# Patient Record
Sex: FEMALE | Race: WHITE | Employment: OTHER | URBAN - METROPOLITAN AREA
[De-identification: names, ages, dates, MRNs, and addresses within clinical notes are randomized per-mention and may not be internally consistent; named-entity substitution may affect disease eponyms.]

---

## 2017-01-03 RX ORDER — LETROZOLE 2.5 MG/1
2.5 TABLET, FILM COATED ORAL
Qty: 30 TABLET | Refills: 6 | Status: SHIPPED
Start: 2017-01-03 | End: 2017-11-13

## 2017-01-04 RX ORDER — LETROZOLE 2.5 MG/1
TABLET, FILM COATED ORAL
Qty: 30 TABLET | Refills: 5 | Status: SHIPPED | OUTPATIENT
Start: 2017-01-04 | End: 2017-02-17

## 2017-02-17 ENCOUNTER — OFFICE VISIT (OUTPATIENT)
Dept: INTERNAL MEDICINE CLINIC | Facility: CLINIC | Age: 67
End: 2017-02-17

## 2017-02-17 VITALS
HEART RATE: 66 BPM | TEMPERATURE: 98 F | RESPIRATION RATE: 12 BRPM | HEIGHT: 59 IN | SYSTOLIC BLOOD PRESSURE: 110 MMHG | WEIGHT: 135 LBS | OXYGEN SATURATION: 96 % | BODY MASS INDEX: 27.21 KG/M2 | DIASTOLIC BLOOD PRESSURE: 72 MMHG

## 2017-02-17 DIAGNOSIS — M48.56XS COMPRESSION FRACTURE OF LUMBAR SPINE, NON-TRAUMATIC, SEQUELA: ICD-10-CM

## 2017-02-17 DIAGNOSIS — Z13.220 SCREENING CHOLESTEROL LEVEL: ICD-10-CM

## 2017-02-17 DIAGNOSIS — C50.011 MALIGNANT NEOPLASM INVOLVING BOTH NIPPLE AND AREOLA OF RIGHT BREAST IN FEMALE (HCC): Primary | ICD-10-CM

## 2017-02-17 DIAGNOSIS — R53.83 OTHER FATIGUE: ICD-10-CM

## 2017-02-17 DIAGNOSIS — M85.89 OSTEOPENIA OF MULTIPLE SITES: ICD-10-CM

## 2017-02-17 PROCEDURE — 99214 OFFICE O/P EST MOD 30 MIN: CPT | Performed by: INTERNAL MEDICINE

## 2017-02-17 NOTE — PROGRESS NOTES
HPI:    Patient ID: Galina Brooks is a 77year old female. HPIpt here fu on ongoing issues and htn. Is on SERM and effexor. Has made a full recovery from CAP. Lymphedema has resolved in the right arm after resolution of clot.     Review of Systems Psychiatric: She has a normal mood and affect.               ASSESSMENT/PLAN:   Malignant neoplasm involving both nipple and areola of right breast in female (hcc)  (primary encounter diagnosis)  Osteopenia of multiple sites  Compression fracture of lumba

## 2017-04-11 ENCOUNTER — OFFICE VISIT (OUTPATIENT)
Dept: FAMILY MEDICINE CLINIC | Facility: CLINIC | Age: 67
End: 2017-04-11

## 2017-04-11 VITALS
OXYGEN SATURATION: 98 % | HEART RATE: 90 BPM | TEMPERATURE: 98 F | RESPIRATION RATE: 16 BRPM | SYSTOLIC BLOOD PRESSURE: 124 MMHG | DIASTOLIC BLOOD PRESSURE: 80 MMHG

## 2017-04-11 DIAGNOSIS — J06.9 URI WITH COUGH AND CONGESTION: Primary | ICD-10-CM

## 2017-04-11 PROCEDURE — 99212 OFFICE O/P EST SF 10 MIN: CPT | Performed by: NURSE PRACTITIONER

## 2017-04-11 NOTE — PROGRESS NOTES
CHIEF COMPLAINT:   Patient presents with:  Cough    HPI:   Easton Enamorado is a 77year old female who presents for cough for 2 weeks. Began with sneezing and a nasal \"itch\". Then progressed to congestion, dry scratchy sore throat and cough.  She has had SKIN: No rashes, or other skin lesions. No changes in color, consistency or texture. HEENT: Denies headache, ear pain or decreased hearing, ocular complaints, nasal congestion or sore throat. See HPI  CARDIOVASCULAR: Denies chest pain or palpitations. - Continue excellent home care strategies  - I am recommending supportive care and symptom management with analgesics, increased fluid intake, steam inhalation, antitussives and expectorants  - Discussed that green sputum does not indicate need for antibio · You may use acetaminophen or ibuprofen to control pain and fever, unless another medicine was prescribed.  (Note: If you have chronic liver or kidney disease, have ever had a stomach ulcer or gastrointestinal bleeding, or are taking blood-thinning medicin

## 2017-04-21 ENCOUNTER — OFFICE VISIT (OUTPATIENT)
Dept: INTERNAL MEDICINE CLINIC | Facility: CLINIC | Age: 67
End: 2017-04-21

## 2017-04-21 VITALS
HEART RATE: 109 BPM | WEIGHT: 141 LBS | DIASTOLIC BLOOD PRESSURE: 80 MMHG | SYSTOLIC BLOOD PRESSURE: 124 MMHG | OXYGEN SATURATION: 97 % | HEIGHT: 59 IN | BODY MASS INDEX: 28.43 KG/M2

## 2017-04-21 DIAGNOSIS — J98.01 POST-INFECTION BRONCHOSPASM: Primary | ICD-10-CM

## 2017-04-21 DIAGNOSIS — J30.1 SEASONAL ALLERGIC RHINITIS DUE TO POLLEN: ICD-10-CM

## 2017-04-21 PROCEDURE — 99213 OFFICE O/P EST LOW 20 MIN: CPT | Performed by: FAMILY MEDICINE

## 2017-04-21 NOTE — PROGRESS NOTES
CC:  Cough      Hx of CC:  COUGH X 2 WEEKS WHICH IS SOMEWHAT IMPROVED. ALSO C/O NASAL CONGESTION AND RHINORRHEA DURING SAME TIME.     Vitals:    04/21/17  0938   BP: 124/80   Pulse: 109   Height: 59\"   Weight: 141 lb   SpO2: 97%         Body mass index is

## 2017-05-01 ENCOUNTER — TELEPHONE (OUTPATIENT)
Dept: INTERNAL MEDICINE CLINIC | Facility: CLINIC | Age: 67
End: 2017-05-01

## 2017-05-01 DIAGNOSIS — C50.911 MALIGNANT NEOPLASM OF RIGHT FEMALE BREAST, UNSPECIFIED SITE OF BREAST: Primary | ICD-10-CM

## 2017-05-02 ENCOUNTER — TELEPHONE (OUTPATIENT)
Dept: INTERNAL MEDICINE CLINIC | Facility: CLINIC | Age: 67
End: 2017-05-02

## 2017-05-02 DIAGNOSIS — C50.912 MALIGNANT NEOPLASM OF LEFT FEMALE BREAST, UNSPECIFIED SITE OF BREAST: Primary | ICD-10-CM

## 2017-05-03 ENCOUNTER — HOSPITAL ENCOUNTER (OUTPATIENT)
Dept: MAMMOGRAPHY | Facility: HOSPITAL | Age: 67
Discharge: HOME | End: 2017-05-03
Attending: NURSE PRACTITIONER
Payer: MEDICARE

## 2017-05-03 ENCOUNTER — HOSPITAL ENCOUNTER (OUTPATIENT)
Dept: BONE DENSITY | Facility: HOSPITAL | Age: 67
Discharge: HOME | End: 2017-05-03
Attending: INTERNAL MEDICINE
Payer: MEDICARE

## 2017-05-03 DIAGNOSIS — Z78.0 ASYMPTOMATIC MENOPAUSAL STATE: ICD-10-CM

## 2017-05-03 DIAGNOSIS — C50.911 MALIGNANT NEOPLASM OF RIGHT FEMALE BREAST, UNSPECIFIED SITE OF BREAST: ICD-10-CM

## 2017-05-03 DIAGNOSIS — Z90.11 H/O RIGHT MASTECTOMY: ICD-10-CM

## 2017-05-03 DIAGNOSIS — M85.80 OSTEOPENIA: ICD-10-CM

## 2017-05-03 DIAGNOSIS — Z13.820 SCREENING FOR OSTEOPOROSIS: ICD-10-CM

## 2017-05-03 PROCEDURE — 77080 DXA BONE DENSITY AXIAL: CPT

## 2017-05-03 PROCEDURE — 77065 DX MAMMO INCL CAD UNI: CPT | Performed by: RADIOLOGY

## 2017-05-04 ENCOUNTER — TELEPHONE (OUTPATIENT)
Dept: INTERNAL MEDICINE CLINIC | Facility: CLINIC | Age: 67
End: 2017-05-04

## 2017-05-11 ENCOUNTER — APPOINTMENT (OUTPATIENT)
Dept: HEMATOLOGY/ONCOLOGY | Facility: HOSPITAL | Age: 67
End: 2017-05-11
Attending: INTERNAL MEDICINE
Payer: MEDICARE

## 2017-05-11 ENCOUNTER — OFFICE VISIT (OUTPATIENT)
Dept: RADIATION ONCOLOGY | Facility: HOSPITAL | Age: 67
End: 2017-05-11
Attending: RADIOLOGY
Payer: MEDICARE

## 2017-05-11 VITALS
HEIGHT: 59 IN | WEIGHT: 140.38 LBS | BODY MASS INDEX: 28.3 KG/M2 | RESPIRATION RATE: 16 BRPM | SYSTOLIC BLOOD PRESSURE: 136 MMHG | DIASTOLIC BLOOD PRESSURE: 73 MMHG | HEART RATE: 85 BPM

## 2017-05-11 PROCEDURE — 99211 OFF/OP EST MAY X REQ PHY/QHP: CPT

## 2017-05-11 NOTE — PROGRESS NOTES
RADIATION ONCOLOGY NOTE    DATE OF VISIT: 5/11/2017    DIAGNOSIS:  Infiltrating ductal carcinoma of the right breast,  stage IIB, status post neoadjuvant endocrine therapy, followed by mastectomy and sentinel lymph node biopsy with 2 out of 3 lymph nodes p ALLERGIES :   No Known Allergies    PAST MEDICAL HISTORY:   has a past medical history of Fx humerus shaft-closed; Hypercholesterolemia; Breast cancer ([facility] [address] Depression; and CVA (cerebral infarction).     PAST SURGICAL HISTORY:   has past surgical his Compression fracture of lumbar spine, non-traumatic ([facility] [address]      80 yo with Infiltrating ductal carcinoma of the right breast,  stage IIB, status post neoadjuvant endocrine therapy, followed by mastectomy and sentinel lymph node biopsy with 2 out of 3 lymph in standard deviation (s.d.). Each 1 s.d. corresponds to approximately 10% below peak normal bone density.          [employer] ORGANIZATION CRITERIA  NORMAL T SCORE:      Above -1 s.d.     OSTEOPENIA T SCORE:           Between -1 and -2.5 s.d.     OSTEOP b - There are scattered areas of fibroglandular density. FINDINGS:        Digital images were obtained and were reviewed with the R2 ZAINAB [de-identified] CAD device. LEFT BREAST:  No significant suspicious finding.   No significant tani

## 2017-05-11 NOTE — PROGRESS NOTES
Pt seen in 6 month follow up with Dr Paris Hutchinson, having completed RT to the R CW 11/5/15. Will see Dr Jens Du 5/15/17. Recent L mammogram and bone density scan available for MD review. Bone density noted severe osteopenia L femoral neck, and osteoporosis to the spine.

## 2017-05-15 ENCOUNTER — OFFICE VISIT (OUTPATIENT)
Dept: HEMATOLOGY/ONCOLOGY | Facility: HOSPITAL | Age: 67
End: 2017-05-15
Attending: NURSE PRACTITIONER
Payer: MEDICARE

## 2017-05-15 VITALS
RESPIRATION RATE: 16 BRPM | TEMPERATURE: 98 F | HEIGHT: 59 IN | SYSTOLIC BLOOD PRESSURE: 132 MMHG | WEIGHT: 137 LBS | HEART RATE: 89 BPM | DIASTOLIC BLOOD PRESSURE: 88 MMHG | BODY MASS INDEX: 27.62 KG/M2

## 2017-05-15 DIAGNOSIS — Z51.81 ENCOUNTER FOR MONITORING AROMATASE INHIBITOR THERAPY: ICD-10-CM

## 2017-05-15 DIAGNOSIS — Z79.811 ENCOUNTER FOR MONITORING AROMATASE INHIBITOR THERAPY: ICD-10-CM

## 2017-05-15 DIAGNOSIS — C50.011 MALIGNANT NEOPLASM INVOLVING BOTH NIPPLE AND AREOLA OF RIGHT BREAST IN FEMALE, ESTROGEN RECEPTOR POSITIVE (HCC): Primary | ICD-10-CM

## 2017-05-15 DIAGNOSIS — Z17.0 MALIGNANT NEOPLASM INVOLVING BOTH NIPPLE AND AREOLA OF RIGHT BREAST IN FEMALE, ESTROGEN RECEPTOR POSITIVE (HCC): Primary | ICD-10-CM

## 2017-05-15 PROCEDURE — 99214 OFFICE O/P EST MOD 30 MIN: CPT | Performed by: NURSE PRACTITIONER

## 2017-05-15 PROCEDURE — G0463 HOSPITAL OUTPT CLINIC VISIT: HCPCS | Performed by: NURSE PRACTITIONER

## 2017-05-15 NOTE — PROGRESS NOTES
HPI     Edwyna Stagers is a 79year old female here for f/u of Malignant neoplasm involving both nipple and areola of right breast in female, estrogen receptor positive (hcc)  (primary encounter diagnosis)  Encounter for monitoring aromatase inhibitor t environmental allergies. Neurological: Negative for dizziness, weakness, light-headedness, numbness and headaches. Hematological: Negative for adenopathy. Does not bruise/bleed easily. Psychiatric/Behavioral: Negative for sleep disturbance.  The patie Comment: toxic fumes      Social History Narrative     Family History   Problem Relation Age of Onset   • Arthritis Mother    • Cancer Father      pancreatic ca age 62s   • Hypertension Father    • Breast Cancer Self 59   • Breast Cancer Paternal Cousin Fe inguinal and no supraclavicular adenopathy present. Left: No inguinal and no supraclavicular adenopathy present. Neurological: She is alert and oriented to person, place, and time. Gait normal.   Skin: No rash noted. No erythema. Psychiatric:  Sh BiRads 1. Lymphedema of the RUE- minimal, encouraged ROM exercises     [facility] info given to patient       No orders of the defined types were placed in this encounter.        Results From Past 48 Hours:  No results found for this or any previous completion of exam.      Breast marker legend used on images     Triangle = Palpable lump  [place]ug[place] = Skin tag or mole  BB = Nipple  Linear feng = Scar    Square = Pain       Final result (5/3/2017  3:46 PM) Open        Study Result      PROCEDURE:   XR DEXA fracture > 3% or a 10-year probability of a major osteoporosis-related fracture > 20% based on the [place]-adapted WHO algorithm      Dictated by (CST): Anna Mortensen MD on 5/03/2017 at 15:43        Approved by (CST): Anna Mortensen MD on 5/0

## 2017-06-22 ENCOUNTER — MA CHART PREP (OUTPATIENT)
Dept: FAMILY MEDICINE CLINIC | Facility: CLINIC | Age: 67
End: 2017-06-22

## 2017-06-22 PROBLEM — I70.0 ATHEROSCLEROSIS OF AORTA: Status: ACTIVE | Noted: 2017-06-22

## 2017-06-22 PROBLEM — M81.0 OSTEOPOROSIS OF LUMBAR SPINE: Status: ACTIVE | Noted: 2017-06-22

## 2017-06-22 PROBLEM — I70.0 ATHEROSCLEROSIS OF AORTA (HCC): Status: ACTIVE | Noted: 2017-06-22

## 2017-06-23 ENCOUNTER — OFFICE VISIT (OUTPATIENT)
Dept: INTERNAL MEDICINE CLINIC | Facility: CLINIC | Age: 67
End: 2017-06-23

## 2017-06-23 VITALS
SYSTOLIC BLOOD PRESSURE: 128 MMHG | TEMPERATURE: 98 F | OXYGEN SATURATION: 99 % | HEART RATE: 97 BPM | WEIGHT: 138 LBS | DIASTOLIC BLOOD PRESSURE: 78 MMHG | BODY MASS INDEX: 28 KG/M2

## 2017-06-23 DIAGNOSIS — F32.A DEPRESSION, UNSPECIFIED DEPRESSION TYPE: ICD-10-CM

## 2017-06-23 DIAGNOSIS — I89.0 LYMPHEDEMA: ICD-10-CM

## 2017-06-23 DIAGNOSIS — M85.80 OSTEOPENIA, UNSPECIFIED LOCATION: ICD-10-CM

## 2017-06-23 DIAGNOSIS — Z90.11 S/P RIGHT MASTECTOMY: ICD-10-CM

## 2017-06-23 DIAGNOSIS — L98.9 PSORIASIS/LIKE DISORDERS: ICD-10-CM

## 2017-06-23 DIAGNOSIS — M79.89 ARM SWELLING: ICD-10-CM

## 2017-06-23 DIAGNOSIS — Z17.0 MALIGNANT NEOPLASM OF NIPPLE OF RIGHT BREAST IN FEMALE, ESTROGEN RECEPTOR POSITIVE (HCC): ICD-10-CM

## 2017-06-23 DIAGNOSIS — E78.2 MIXED HYPERLIPIDEMIA: ICD-10-CM

## 2017-06-23 DIAGNOSIS — C50.011 MALIGNANT NEOPLASM OF NIPPLE OF RIGHT BREAST IN FEMALE, ESTROGEN RECEPTOR POSITIVE (HCC): ICD-10-CM

## 2017-06-23 DIAGNOSIS — Z00.00 MEDICARE ANNUAL WELLNESS VISIT, INITIAL: Primary | ICD-10-CM

## 2017-06-23 DIAGNOSIS — I10 ESSENTIAL HYPERTENSION: ICD-10-CM

## 2017-06-23 DIAGNOSIS — Z78.0 ASYMPTOMATIC MENOPAUSAL STATE: ICD-10-CM

## 2017-06-23 PROCEDURE — 96160 PT-FOCUSED HLTH RISK ASSMT: CPT | Performed by: INTERNAL MEDICINE

## 2017-06-23 PROCEDURE — 99397 PER PM REEVAL EST PAT 65+ YR: CPT | Performed by: INTERNAL MEDICINE

## 2017-06-23 PROCEDURE — G0438 PPPS, INITIAL VISIT: HCPCS | Performed by: INTERNAL MEDICINE

## 2017-06-23 RX ORDER — VALSARTAN 80 MG/1
80 TABLET ORAL DAILY
Qty: 30 TABLET | Refills: 6 | Status: SHIPPED | OUTPATIENT
Start: 2017-06-23 | End: 2018-02-04

## 2017-06-23 RX ORDER — VENLAFAXINE 37.5 MG/1
37.5 TABLET ORAL DAILY
Qty: 30 TABLET | Refills: 6 | Status: SHIPPED | OUTPATIENT
Start: 2017-06-23 | End: 2018-02-07

## 2017-06-23 RX ORDER — ATORVASTATIN CALCIUM 20 MG/1
20 TABLET, FILM COATED ORAL NIGHTLY
Qty: 30 TABLET | Refills: 6 | Status: SHIPPED | OUTPATIENT
Start: 2017-06-23 | End: 2017-11-13 | Stop reason: DRUGHIGH

## 2017-06-23 RX ORDER — RISEDRONATE SODIUM 150 MG/1
150 TABLET, FILM COATED ORAL
Qty: 1 TABLET | Refills: 6 | Status: SHIPPED | OUTPATIENT
Start: 2017-06-23 | End: 2017-11-13

## 2017-06-23 NOTE — PROGRESS NOTES
HPI:    Patient ID: Sagrario Velásquez is a 79year old female. HPI  MA supervisit. Fu on bp, hyperlipidemia and right sided breast cancer. Rescnt Dexa shows osteoporosis. Has left hip pain Has a erythemetous rash on both feet w nail involvement.     Rev Visit:  Signed Prescriptions Disp Refills    atorvastatin 20 MG Oral Tab 30 tablet 6      Sig: Take 1 tablet (20 mg total) by mouth nightly. valsartan 80 MG Oral Tab 30 tablet 6      Sig: Take 1 tablet (80 mg total) by mouth daily.       Venlafaxine HC Able without help    Taking medications as prescribed: Able without help    Are you able to afford your medications?: Yes    Hearing Problems?: Yes     Functional Status     Hearing Problems?: Yes    Vision Problems? : Yes    Difficulty walking?: Yes    Roderick Richey by mouth daily. Disp: 30 tablet Rfl: 6   Venlafaxine HCl 37.5 MG Oral Tab Take 1 tablet (37.5 mg total) by mouth daily. Disp: 30 tablet Rfl: 6   Risedronate Sodium 150 MG Oral Tab Take 1 tablet (150 mg total) by mouth every 30 (thirty) days.  Disp: 1 tablet abdominal pain, denies heartburn  : denies dysuria, vaginal discharge or itching, no complaint of urinary incontinence   MUSCULOSKELETAL: denies back pain left hip pain  NEURO: denies headaches  PSYCHE: depression  HEMATOLOGIC: denies hx of anemia  ENDOC Vitamin D, 25-Hydroxy [E]; Future    Essential hypertension    Mixed hyperlipidemia  -     Comp Metabolic Panel (14) [E]; Future  -     Lipid Panel [E]; Future  -     TSH W Reflex To Free T4 [E];  Future    Psoriasis/like disorders  -     Derm Referral - In care reminders to display for this patient. Update Health Maintenance if applicable    Pap  Every two years There are no preventive care reminders to display for this patient.  Update Health Maintenance if applicable    Chlamydia  Annually if high risk No r visit. No flowsheet data found.       SCREENING SCHEDULE - FEMALE      SCREEN COVERAGE SCHEDULE  (If Indicated) LAST DONE   Dexa If at Risk q2 years    Lipids All Patients q5 years LDL CHOLESTEROL (mg/dL)   Date Value   11/02/2016 141*      Colonoscopy All

## 2017-06-26 ENCOUNTER — TELEPHONE (OUTPATIENT)
Dept: INTERNAL MEDICINE CLINIC | Facility: CLINIC | Age: 67
End: 2017-06-26

## 2017-06-26 DIAGNOSIS — R39.9 UTI SYMPTOMS: Primary | ICD-10-CM

## 2017-06-27 ENCOUNTER — NURSE ONLY (OUTPATIENT)
Dept: INTERNAL MEDICINE CLINIC | Facility: CLINIC | Age: 67
End: 2017-06-27

## 2017-06-27 DIAGNOSIS — E78.2 MIXED HYPERLIPIDEMIA: ICD-10-CM

## 2017-06-27 DIAGNOSIS — M85.80 OSTEOPENIA, UNSPECIFIED LOCATION: ICD-10-CM

## 2017-06-27 DIAGNOSIS — Z00.00 MEDICARE ANNUAL WELLNESS VISIT, INITIAL: ICD-10-CM

## 2017-06-27 PROCEDURE — 36415 COLL VENOUS BLD VENIPUNCTURE: CPT | Performed by: INTERNAL MEDICINE

## 2017-06-27 PROCEDURE — 84443 ASSAY THYROID STIM HORMONE: CPT | Performed by: INTERNAL MEDICINE

## 2017-06-27 PROCEDURE — 82306 VITAMIN D 25 HYDROXY: CPT | Performed by: INTERNAL MEDICINE

## 2017-06-27 PROCEDURE — 80061 LIPID PANEL: CPT | Performed by: INTERNAL MEDICINE

## 2017-06-27 PROCEDURE — 85027 COMPLETE CBC AUTOMATED: CPT | Performed by: INTERNAL MEDICINE

## 2017-06-27 PROCEDURE — 80053 COMPREHEN METABOLIC PANEL: CPT | Performed by: INTERNAL MEDICINE

## 2017-09-06 ENCOUNTER — TELEPHONE (OUTPATIENT)
Dept: INTERNAL MEDICINE CLINIC | Facility: CLINIC | Age: 67
End: 2017-09-06

## 2017-09-06 DIAGNOSIS — Z12.11 COLON CANCER SCREENING: Primary | ICD-10-CM

## 2017-11-13 ENCOUNTER — OFFICE VISIT (OUTPATIENT)
Dept: HEMATOLOGY/ONCOLOGY | Facility: HOSPITAL | Age: 67
End: 2017-11-13
Attending: INTERNAL MEDICINE
Payer: MEDICARE

## 2017-11-13 VITALS
TEMPERATURE: 99 F | RESPIRATION RATE: 16 BRPM | SYSTOLIC BLOOD PRESSURE: 136 MMHG | HEIGHT: 59 IN | HEART RATE: 89 BPM | BODY MASS INDEX: 27.62 KG/M2 | DIASTOLIC BLOOD PRESSURE: 83 MMHG | WEIGHT: 137 LBS

## 2017-11-13 DIAGNOSIS — C50.411 MALIGNANT NEOPLASM OF UPPER-OUTER QUADRANT OF RIGHT BREAST IN FEMALE, ESTROGEN RECEPTOR POSITIVE (HCC): Primary | ICD-10-CM

## 2017-11-13 DIAGNOSIS — Z17.0 MALIGNANT NEOPLASM OF UPPER-OUTER QUADRANT OF RIGHT BREAST IN FEMALE, ESTROGEN RECEPTOR POSITIVE (HCC): Primary | ICD-10-CM

## 2017-11-13 DIAGNOSIS — Z90.11 S/P RIGHT MASTECTOMY: ICD-10-CM

## 2017-11-13 DIAGNOSIS — Z12.31 SCREENING MAMMOGRAM, ENCOUNTER FOR: ICD-10-CM

## 2017-11-13 DIAGNOSIS — Z79.811 ENCOUNTER FOR MONITORING AROMATASE INHIBITOR THERAPY: ICD-10-CM

## 2017-11-13 DIAGNOSIS — Z51.81 ENCOUNTER FOR MONITORING AROMATASE INHIBITOR THERAPY: ICD-10-CM

## 2017-11-13 PROCEDURE — G0463 HOSPITAL OUTPT CLINIC VISIT: HCPCS | Performed by: INTERNAL MEDICINE

## 2017-11-13 PROCEDURE — 99214 OFFICE O/P EST MOD 30 MIN: CPT | Performed by: INTERNAL MEDICINE

## 2017-11-13 RX ORDER — LETROZOLE 2.5 MG/1
2.5 TABLET, FILM COATED ORAL
Qty: 30 TABLET | Refills: 6 | Status: SHIPPED | OUTPATIENT
Start: 2017-11-13 | End: 2018-05-14

## 2017-11-13 NOTE — PROGRESS NOTES
HPI     Earnest Silveira is a 79year old female here for f/u of Malignant neoplasm of upper-outer quadrant of right breast in female, estrogen receptor positive (hcc)  (primary encounter diagnosis)  Encounter for monitoring aromatase inhibitor therapy  S pallor. Allergic/Immunologic: Positive for environmental allergies. Neurological: Positive for dizziness (from effexor) and weakness (L side weak from surgery. ). Negative for light-headedness, numbness and headaches.    Hematological: Negative for adeno Smoker  1.00 Packs/day  For 20.00 Years     Smokeless tobacco: Never Used    Alcohol use Yes  0.6 oz/week    1 Glasses of wine per week         Comment: rarely    Drug use: No    Sexual activity: Not on file     Other Topics Concern    Caffeine Concern Yes tenderness. Right mastectomy. Abdominal: Soft. Bowel sounds are normal. She exhibits no distension and no mass. There is no tenderness. There is no rebound and no guarding. Musculoskeletal: She exhibits edema (uppper right arm ).  She exhibits no te did have positive LNs with extracapsular extension. 2/3 LN  RT to chest wall and axilla completed. Oncotype Dx RS of 19. No additional benefit from chemo.     Will continue with the femara  4 yrs of femara alone (5 yr total adjuvant, until August o

## 2018-02-05 RX ORDER — VALSARTAN 80 MG/1
TABLET ORAL
Qty: 30 TABLET | Refills: 0 | Status: SHIPPED | OUTPATIENT
Start: 2018-02-05 | End: 2018-03-08

## 2018-02-07 DIAGNOSIS — F32.A DEPRESSION, UNSPECIFIED DEPRESSION TYPE: ICD-10-CM

## 2018-02-07 RX ORDER — VENLAFAXINE 37.5 MG/1
TABLET ORAL
Qty: 30 TABLET | Refills: 0 | Status: SHIPPED | OUTPATIENT
Start: 2018-02-07 | End: 2018-03-18

## 2018-03-08 RX ORDER — VALSARTAN 80 MG/1
TABLET ORAL
Qty: 30 TABLET | Refills: 0 | Status: SHIPPED | OUTPATIENT
Start: 2018-03-08 | End: 2018-03-30

## 2018-03-08 RX ORDER — ATORVASTATIN CALCIUM 40 MG/1
TABLET, FILM COATED ORAL
Qty: 30 TABLET | Refills: 0 | Status: SHIPPED | OUTPATIENT
Start: 2018-03-08 | End: 2018-03-30

## 2018-03-18 DIAGNOSIS — F32.A DEPRESSION, UNSPECIFIED DEPRESSION TYPE: ICD-10-CM

## 2018-03-19 RX ORDER — VENLAFAXINE 37.5 MG/1
TABLET ORAL
Qty: 30 TABLET | Refills: 0 | Status: SHIPPED | OUTPATIENT
Start: 2018-03-19 | End: 2018-03-30

## 2018-03-28 PROBLEM — Z12.31 SCREENING MAMMOGRAM, ENCOUNTER FOR: Status: RESOLVED | Noted: 2017-11-13 | Resolved: 2018-03-28

## 2018-03-28 PROBLEM — M81.0 OSTEOPOROSIS OF LUMBAR SPINE: Status: RESOLVED | Noted: 2017-06-22 | Resolved: 2018-03-28

## 2018-03-28 PROBLEM — I70.0 ATHEROSCLEROSIS OF AORTA: Status: RESOLVED | Noted: 2017-06-22 | Resolved: 2018-03-28

## 2018-03-28 PROBLEM — I70.0 ATHEROSCLEROSIS OF AORTA (HCC): Status: RESOLVED | Noted: 2017-06-22 | Resolved: 2018-03-28

## 2018-03-30 ENCOUNTER — OFFICE VISIT (OUTPATIENT)
Dept: INTERNAL MEDICINE CLINIC | Facility: CLINIC | Age: 68
End: 2018-03-30

## 2018-03-30 VITALS
DIASTOLIC BLOOD PRESSURE: 80 MMHG | OXYGEN SATURATION: 98 % | SYSTOLIC BLOOD PRESSURE: 128 MMHG | BODY MASS INDEX: 27.21 KG/M2 | HEIGHT: 59 IN | HEART RATE: 91 BPM | TEMPERATURE: 99 F | WEIGHT: 135 LBS | RESPIRATION RATE: 18 BRPM

## 2018-03-30 DIAGNOSIS — F32.A DEPRESSION, UNSPECIFIED DEPRESSION TYPE: ICD-10-CM

## 2018-03-30 DIAGNOSIS — Z12.11 COLON CANCER SCREENING: ICD-10-CM

## 2018-03-30 DIAGNOSIS — C50.011 MALIGNANT NEOPLASM OF NIPPLE OF RIGHT BREAST IN FEMALE, ESTROGEN RECEPTOR POSITIVE (HCC): ICD-10-CM

## 2018-03-30 DIAGNOSIS — Z00.00 MEDICARE ANNUAL WELLNESS VISIT, SUBSEQUENT: Primary | ICD-10-CM

## 2018-03-30 DIAGNOSIS — Z17.0 MALIGNANT NEOPLASM OF NIPPLE OF RIGHT BREAST IN FEMALE, ESTROGEN RECEPTOR POSITIVE (HCC): ICD-10-CM

## 2018-03-30 DIAGNOSIS — E78.2 MIXED HYPERLIPIDEMIA: ICD-10-CM

## 2018-03-30 DIAGNOSIS — I10 ESSENTIAL HYPERTENSION: ICD-10-CM

## 2018-03-30 LAB
ALBUMIN SERPL BCP-MCNC: 4.4 G/DL (ref 3.5–4.8)
ALBUMIN/GLOB SERPL: 1.8 {RATIO} (ref 1–2)
ALP SERPL-CCNC: 88 U/L (ref 32–100)
ALT SERPL-CCNC: 23 U/L (ref 14–54)
ANION GAP SERPL CALC-SCNC: 11 MMOL/L (ref 0–18)
AST SERPL-CCNC: 25 U/L (ref 15–41)
BASOPHILS # BLD: 0.1 K/UL (ref 0–0.2)
BASOPHILS NFR BLD: 1 %
BILIRUB SERPL-MCNC: 0.8 MG/DL (ref 0.3–1.2)
BUN SERPL-MCNC: 9 MG/DL (ref 8–20)
BUN/CREAT SERPL: 10.5 (ref 10–20)
CALCIUM SERPL-MCNC: 9.3 MG/DL (ref 8.5–10.5)
CHLORIDE SERPL-SCNC: 107 MMOL/L (ref 95–110)
CHOLEST SERPL-MCNC: 188 MG/DL (ref 110–200)
CO2 SERPL-SCNC: 22 MMOL/L (ref 22–32)
CREAT SERPL-MCNC: 0.86 MG/DL (ref 0.5–1.5)
EOSINOPHIL # BLD: 0.1 K/UL (ref 0–0.7)
EOSINOPHIL NFR BLD: 1 %
ERYTHROCYTE [DISTWIDTH] IN BLOOD BY AUTOMATED COUNT: 13.8 % (ref 11–15)
GLOBULIN PLAS-MCNC: 2.4 G/DL (ref 2.5–3.7)
GLUCOSE SERPL-MCNC: 101 MG/DL (ref 70–99)
HCT VFR BLD AUTO: 45.7 % (ref 35–48)
HDLC SERPL-MCNC: 44 MG/DL
HGB BLD-MCNC: 15.7 G/DL (ref 12–16)
LDLC SERPL CALC-MCNC: 99 MG/DL (ref 0–99)
LYMPHOCYTES # BLD: 1.8 K/UL (ref 1–4)
LYMPHOCYTES NFR BLD: 22 %
MCH RBC QN AUTO: 30.8 PG (ref 27–32)
MCHC RBC AUTO-ENTMCNC: 34.3 G/DL (ref 32–37)
MCV RBC AUTO: 89.9 FL (ref 80–100)
MONOCYTES # BLD: 0.3 K/UL (ref 0–1)
MONOCYTES NFR BLD: 4 %
NEUTROPHILS # BLD AUTO: 5.9 K/UL (ref 1.8–7.7)
NEUTROPHILS NFR BLD: 72 %
NONHDLC SERPL-MCNC: 144 MG/DL
OSMOLALITY UR CALC.SUM OF ELEC: 289 MOSM/KG (ref 275–295)
PATIENT FASTING: YES
PLATELET # BLD AUTO: 227 K/UL (ref 140–400)
PMV BLD AUTO: 8.9 FL (ref 7.4–10.3)
POTASSIUM SERPL-SCNC: 4.1 MMOL/L (ref 3.3–5.1)
PROT SERPL-MCNC: 6.8 G/DL (ref 5.9–8.4)
RBC # BLD AUTO: 5.08 M/UL (ref 3.7–5.4)
SODIUM SERPL-SCNC: 140 MMOL/L (ref 136–144)
TRIGL SERPL-MCNC: 226 MG/DL (ref 1–149)
TSH SERPL-ACNC: 1.55 UIU/ML (ref 0.45–5.33)
WBC # BLD AUTO: 8.2 K/UL (ref 4–11)

## 2018-03-30 PROCEDURE — 84443 ASSAY THYROID STIM HORMONE: CPT | Performed by: INTERNAL MEDICINE

## 2018-03-30 PROCEDURE — 80061 LIPID PANEL: CPT | Performed by: INTERNAL MEDICINE

## 2018-03-30 PROCEDURE — 96160 PT-FOCUSED HLTH RISK ASSMT: CPT | Performed by: INTERNAL MEDICINE

## 2018-03-30 PROCEDURE — 99397 PER PM REEVAL EST PAT 65+ YR: CPT | Performed by: INTERNAL MEDICINE

## 2018-03-30 PROCEDURE — 80053 COMPREHEN METABOLIC PANEL: CPT | Performed by: INTERNAL MEDICINE

## 2018-03-30 PROCEDURE — G0439 PPPS, SUBSEQ VISIT: HCPCS | Performed by: INTERNAL MEDICINE

## 2018-03-30 PROCEDURE — 36415 COLL VENOUS BLD VENIPUNCTURE: CPT | Performed by: INTERNAL MEDICINE

## 2018-03-30 PROCEDURE — 85025 COMPLETE CBC W/AUTO DIFF WBC: CPT | Performed by: INTERNAL MEDICINE

## 2018-03-30 RX ORDER — BUPROPION HYDROCHLORIDE 150 MG/1
150 TABLET ORAL DAILY
Qty: 30 TABLET | Refills: 2 | Status: SHIPPED | OUTPATIENT
Start: 2018-03-30 | End: 2019-04-02

## 2018-03-30 RX ORDER — VENLAFAXINE 37.5 MG/1
37.5 TABLET ORAL DAILY
Qty: 90 TABLET | Refills: 3 | Status: SHIPPED | OUTPATIENT
Start: 2018-03-30 | End: 2019-04-15

## 2018-03-30 RX ORDER — ATORVASTATIN CALCIUM 40 MG/1
40 TABLET, FILM COATED ORAL NIGHTLY
Qty: 90 TABLET | Refills: 3 | Status: SHIPPED | OUTPATIENT
Start: 2018-03-30 | End: 2019-04-04

## 2018-03-30 RX ORDER — VALSARTAN 80 MG/1
80 TABLET ORAL DAILY
Qty: 90 TABLET | Refills: 3 | Status: SHIPPED | OUTPATIENT
Start: 2018-03-30 | End: 2018-08-13

## 2018-03-30 NOTE — PROGRESS NOTES
HPI:    Patient ID: Sabine Wright is a 79year old female. Pt here for a MA supervisit and fu on htn, beast cancer and depression and hyperlipidemia. HPI:   Sabine Wright is a 79year old female who presents for a MA Supervisit.     Patient Active WEEKS   Little interest or pleasure in doing things (over the last two weeks)?: Not at all    Feeling down, depressed, or hopeless (over the last two weeks)?: Not at all    PHQ-2 SCORE: 0        Advance Directives     Do you have a healthcare power of atto shaft-closed     surgical repair   • Hypercholesterolemia       Past Surgical History:  12/2015: HUMERUS FRACTURE SURGERY Right      Comment: ORIF  2015: MASTECTOMY RIGHT  2015: RADIATION RIGHT   Family History   Problem Relation Age of Onset   • Arthritis suspicious lesions  HEENT: atraumatic, normocephalic, ears and throat are clear hearing aids bilateral     EYES: PERRLA, EOMI, conjunctiva are clear  Right Eye Visual Acuity: Corrected Left Eye Visual Acuity: Corrected   Right Eye Chart Acuity: 20/30 Left EKG One Time     Colorectal Cancer Screening      Colonoscopy Screen every 10 years Colonoscopy,10 Years due on 05/01/2000 Update Health Maintenance if applicable    Flex Sigmoidoscopy Screen every 10 years No results found for this or any previous visit. (mg/dL)   Date Value   04/25/2016 0.59    No flowsheet data found. BUN  Annually BUN (mg/dL)   Date Value   06/27/2017 16   04/25/2016 7 (L)    No flowsheet data found.      Drug Serum Conc  Annually No results found for: DIGOXIN, DIG, VALP No flowsheet Outpatient Prescriptions:  BuPROPion HCl ER, XL, 150 MG Oral Tablet 24 Hr Take 1 tablet (150 mg total) by mouth daily.  Disp: 30 tablet Rfl: 2   VENLAFAXINE HCL 37.5 MG Oral Tab TAKE 1 TABLET(37.5 MG) BY MOUTH DAILY Disp: 30 tablet Rfl: 0   VALSARTAN 80 MG

## 2018-03-30 NOTE — PROGRESS NOTES
Pt's  verified  Pt presented for a fasting blood draw. Per Dr Tony Mccall [facility] CBC LIPID CMP . Pt tolerated venipuncture well,specimens drawn from RT  arm  and sent out to [facility] lab for testing.

## 2018-05-07 ENCOUNTER — HOSPITAL ENCOUNTER (OUTPATIENT)
Dept: MAMMOGRAPHY | Facility: HOSPITAL | Age: 68
Discharge: HOME | End: 2018-05-07
Attending: INTERNAL MEDICINE
Payer: MEDICARE

## 2018-05-07 DIAGNOSIS — Z12.31 SCREENING MAMMOGRAM, ENCOUNTER FOR: ICD-10-CM

## 2018-05-07 DIAGNOSIS — Z90.11 S/P RIGHT MASTECTOMY: ICD-10-CM

## 2018-05-07 PROCEDURE — 77067 SCR MAMMO BI INCL CAD: CPT | Performed by: INTERNAL MEDICINE

## 2018-05-14 ENCOUNTER — OFFICE VISIT (OUTPATIENT)
Dept: HEMATOLOGY/ONCOLOGY | Facility: HOSPITAL | Age: 68
End: 2018-05-14
Attending: INTERNAL MEDICINE
Payer: MEDICARE

## 2018-05-14 VITALS
HEART RATE: 92 BPM | DIASTOLIC BLOOD PRESSURE: 82 MMHG | RESPIRATION RATE: 16 BRPM | SYSTOLIC BLOOD PRESSURE: 145 MMHG | HEIGHT: 59 IN | BODY MASS INDEX: 28.22 KG/M2 | TEMPERATURE: 98 F | WEIGHT: 140 LBS

## 2018-05-14 DIAGNOSIS — Z79.811 ENCOUNTER FOR MONITORING AROMATASE INHIBITOR THERAPY: ICD-10-CM

## 2018-05-14 DIAGNOSIS — Z51.81 ENCOUNTER FOR MONITORING AROMATASE INHIBITOR THERAPY: ICD-10-CM

## 2018-05-14 DIAGNOSIS — C50.411 MALIGNANT NEOPLASM OF UPPER-OUTER QUADRANT OF RIGHT BREAST IN FEMALE, ESTROGEN RECEPTOR POSITIVE (HCC): Primary | ICD-10-CM

## 2018-05-14 DIAGNOSIS — Z17.0 MALIGNANT NEOPLASM OF UPPER-OUTER QUADRANT OF RIGHT BREAST IN FEMALE, ESTROGEN RECEPTOR POSITIVE (HCC): Primary | ICD-10-CM

## 2018-05-14 PROCEDURE — 99214 OFFICE O/P EST MOD 30 MIN: CPT | Performed by: INTERNAL MEDICINE

## 2018-05-14 RX ORDER — LETROZOLE 2.5 MG/1
2.5 TABLET, FILM COATED ORAL
Qty: 30 TABLET | Refills: 6 | Status: SHIPPED | OUTPATIENT
Start: 2018-05-14 | End: 2018-11-05

## 2018-05-14 NOTE — PROGRESS NOTES
DAVE     Cayden Wilson is a 76year old female here for f/u of Malignant neoplasm of upper-outer quadrant of right breast in female, estrogen receptor positive (hcc)  (primary encounter diagnosis)  Encounter for monitoring aromatase inhibitor therapy ). Negative for arthralgias, myalgias and neck pain. Right upper arm swelling has decreased with own lymphedema massage. Skin: Negative for pallor and rash. Has psoriasis. Allergic/Immunologic: Positive for environmental allergies.    Timmy Roblero ORIF  2015: MASTECTOMY RIGHT  2015: RADIATION RIGHT    Social History  Social History   Marital status:   Spouse name: N/A    Years of education: N/A  Number of children: 2     Occupational History  disabled   exposure to chemical fumes      Social present. Cardiovascular: Normal rate, regular rhythm and normal heart sounds. No murmur heard. Pulmonary/Chest: Effort normal and breath sounds normal. No respiratory distress. She has no wheezes.  Left breast exhibits no inverted nipple, no mass, no [id number] 1+      Ki 67 13% borderline            Oncotype Dx RS for node positive 1-3  RS 19, no benefit from addition of       chemotherapy. S/p 1 yr of neoadjuvant femara with some down staging from stage IIB to stage IIA.   She did have positive L were reviewed with the R2 ZAINAB [de-identified] CAD device.                 A mole marker was placed over the left breast. There are benign calcifications in the left breast.  The parenchymal pattern is stable with no new suspicious asymmetry, mass, architectural dis

## 2018-06-04 ENCOUNTER — TELEPHONE (OUTPATIENT)
Dept: INTERNAL MEDICINE CLINIC | Facility: CLINIC | Age: 68
End: 2018-06-04

## 2018-06-29 ENCOUNTER — OFFICE VISIT (OUTPATIENT)
Dept: INTERNAL MEDICINE CLINIC | Facility: CLINIC | Age: 68
End: 2018-06-29

## 2018-06-29 VITALS
RESPIRATION RATE: 17 BRPM | SYSTOLIC BLOOD PRESSURE: 132 MMHG | HEART RATE: 91 BPM | HEIGHT: 59 IN | OXYGEN SATURATION: 96 % | WEIGHT: 135 LBS | BODY MASS INDEX: 27.21 KG/M2 | DIASTOLIC BLOOD PRESSURE: 82 MMHG | TEMPERATURE: 98 F

## 2018-06-29 DIAGNOSIS — I10 ESSENTIAL HYPERTENSION: ICD-10-CM

## 2018-06-29 DIAGNOSIS — C50.011 MALIGNANT NEOPLASM OF NIPPLE OF RIGHT BREAST IN FEMALE, ESTROGEN RECEPTOR POSITIVE (HCC): Primary | ICD-10-CM

## 2018-06-29 DIAGNOSIS — Z17.0 MALIGNANT NEOPLASM OF NIPPLE OF RIGHT BREAST IN FEMALE, ESTROGEN RECEPTOR POSITIVE (HCC): Primary | ICD-10-CM

## 2018-06-29 DIAGNOSIS — Z12.11 COLON CANCER SCREENING: ICD-10-CM

## 2018-06-29 DIAGNOSIS — F32.A DEPRESSION, UNSPECIFIED DEPRESSION TYPE: ICD-10-CM

## 2018-06-29 DIAGNOSIS — E78.2 MIXED HYPERLIPIDEMIA: ICD-10-CM

## 2018-06-29 PROCEDURE — 99214 OFFICE O/P EST MOD 30 MIN: CPT | Performed by: INTERNAL MEDICINE

## 2018-06-29 NOTE — PROGRESS NOTES
HPI:    Patient ID: Brain Ripper is a 76year old female. Pt here for fu on ongoing issues with htn, breast Ca and chronic allergies. Struggling a bit with sinus congestion. Has been alittle stressed recently.     Review of Systems   Constitutional: cap TID,as needed Disp:  Rfl:    Calcium Carbonate-Vitamin D (CALCIUM-D) 600-400 MG-UNIT Oral Tab Take 1 tablet by mouth daily. Disp:  Rfl:    Cholecalciferol (VITAMIN D3) 5000 UNITS Oral Tab Take 5,000 Units by mouth daily.  Disp:  Rfl:      Allergies:No K

## 2018-07-05 RX ORDER — LETROZOLE 2.5 MG/1
TABLET, FILM COATED ORAL
Qty: 90 TABLET | Refills: 6 | Status: SHIPPED | OUTPATIENT
Start: 2018-07-05 | End: 2019-09-30

## 2018-07-12 ENCOUNTER — TELEPHONE (OUTPATIENT)
Dept: INTERNAL MEDICINE CLINIC | Facility: CLINIC | Age: 68
End: 2018-07-12

## 2018-07-12 NOTE — TELEPHONE ENCOUNTER
Pt left message for Juan Eli to return her call, seems like there is a mix up with her PCP, seems like we have an old card scanned but Dr. Maritza Durant is her [facility] Doctor.

## 2018-07-13 NOTE — TELEPHONE ENCOUNTER
Sent message to Srikanth Potts in referral dept re: Pt contacting insurance and verifying that Dr Robbie Paredes is listed as PCP awaiting response re: gastro referral order for Dr Becca Ferreira

## 2018-07-13 NOTE — TELEPHONE ENCOUNTER
Pt's  verified Called Pt and informed referral approval received today from Court Thakkar in referral [address] [place] [address] Drive referral#[id number] valid for 6 visits from 18-19   She requested to have copy printed and mailed to verified address on file- i

## 2018-08-13 ENCOUNTER — TELEPHONE (OUTPATIENT)
Dept: INTERNAL MEDICINE CLINIC | Facility: CLINIC | Age: 68
End: 2018-08-13

## 2018-08-13 NOTE — TELEPHONE ENCOUNTER
PT called she's currently taking Valsartan, PT wants to know if Dr. Delia Troy can prescribe a different med for her BP because there is a recall on Valsartan.

## 2018-11-05 ENCOUNTER — OFFICE VISIT (OUTPATIENT)
Dept: HEMATOLOGY/ONCOLOGY | Facility: HOSPITAL | Age: 68
End: 2018-11-05
Attending: INTERNAL MEDICINE
Payer: MEDICARE

## 2018-11-05 VITALS
HEIGHT: 59 IN | BODY MASS INDEX: 28.43 KG/M2 | RESPIRATION RATE: 16 BRPM | WEIGHT: 141 LBS | SYSTOLIC BLOOD PRESSURE: 125 MMHG | DIASTOLIC BLOOD PRESSURE: 78 MMHG | HEART RATE: 85 BPM | TEMPERATURE: 98 F

## 2018-11-05 DIAGNOSIS — Z12.31 SCREENING MAMMOGRAM, ENCOUNTER FOR: Primary | ICD-10-CM

## 2018-11-05 DIAGNOSIS — Z90.11 S/P RIGHT MASTECTOMY: ICD-10-CM

## 2018-11-05 DIAGNOSIS — Z51.81 ENCOUNTER FOR MONITORING AROMATASE INHIBITOR THERAPY: ICD-10-CM

## 2018-11-05 DIAGNOSIS — C50.411 MALIGNANT NEOPLASM OF UPPER-OUTER QUADRANT OF RIGHT BREAST IN FEMALE, ESTROGEN RECEPTOR POSITIVE (HCC): ICD-10-CM

## 2018-11-05 DIAGNOSIS — Z17.0 MALIGNANT NEOPLASM OF UPPER-OUTER QUADRANT OF RIGHT BREAST IN FEMALE, ESTROGEN RECEPTOR POSITIVE (HCC): ICD-10-CM

## 2018-11-05 DIAGNOSIS — M81.0 OSTEOPOROSIS OF LUMBAR SPINE: ICD-10-CM

## 2018-11-05 DIAGNOSIS — Z79.811 ENCOUNTER FOR MONITORING AROMATASE INHIBITOR THERAPY: ICD-10-CM

## 2018-11-05 PROCEDURE — 99214 OFFICE O/P EST MOD 30 MIN: CPT | Performed by: INTERNAL MEDICINE

## 2018-11-05 RX ORDER — VALSARTAN 80 MG/1
TABLET ORAL
COMMUNITY
Start: 2018-07-05 | End: 2019-09-03

## 2019-02-25 ENCOUNTER — TELEPHONE (OUTPATIENT)
Dept: INTERNAL MEDICINE CLINIC | Facility: CLINIC | Age: 69
End: 2019-02-25

## 2019-03-15 RX ORDER — LOSARTAN POTASSIUM 100 MG/1
TABLET ORAL
Qty: 30 TABLET | Refills: 0 | Status: SHIPPED | OUTPATIENT
Start: 2019-03-15 | End: 2019-04-02

## 2019-04-02 ENCOUNTER — OFFICE VISIT (OUTPATIENT)
Dept: INTERNAL MEDICINE CLINIC | Facility: CLINIC | Age: 69
End: 2019-04-02
Payer: COMMERCIAL

## 2019-04-02 VITALS
SYSTOLIC BLOOD PRESSURE: 136 MMHG | WEIGHT: 141 LBS | DIASTOLIC BLOOD PRESSURE: 80 MMHG | HEART RATE: 88 BPM | OXYGEN SATURATION: 98 % | BODY MASS INDEX: 28.43 KG/M2 | HEIGHT: 59 IN

## 2019-04-02 DIAGNOSIS — E78.2 MIXED HYPERLIPIDEMIA: ICD-10-CM

## 2019-04-02 DIAGNOSIS — I10 ESSENTIAL HYPERTENSION: ICD-10-CM

## 2019-04-02 DIAGNOSIS — Z17.0 MALIGNANT NEOPLASM OF NIPPLE OF RIGHT BREAST IN FEMALE, ESTROGEN RECEPTOR POSITIVE (HCC): ICD-10-CM

## 2019-04-02 DIAGNOSIS — Z00.00 MEDICARE ANNUAL WELLNESS VISIT, SUBSEQUENT: Primary | ICD-10-CM

## 2019-04-02 DIAGNOSIS — F32.A DEPRESSION, UNSPECIFIED DEPRESSION TYPE: ICD-10-CM

## 2019-04-02 DIAGNOSIS — C50.011 MALIGNANT NEOPLASM OF NIPPLE OF RIGHT BREAST IN FEMALE, ESTROGEN RECEPTOR POSITIVE (HCC): ICD-10-CM

## 2019-04-02 PROCEDURE — 80061 LIPID PANEL: CPT | Performed by: INTERNAL MEDICINE

## 2019-04-02 PROCEDURE — G0439 PPPS, SUBSEQ VISIT: HCPCS | Performed by: INTERNAL MEDICINE

## 2019-04-02 PROCEDURE — 85025 COMPLETE CBC W/AUTO DIFF WBC: CPT | Performed by: INTERNAL MEDICINE

## 2019-04-02 PROCEDURE — 99397 PER PM REEVAL EST PAT 65+ YR: CPT | Performed by: INTERNAL MEDICINE

## 2019-04-02 PROCEDURE — 80053 COMPREHEN METABOLIC PANEL: CPT | Performed by: INTERNAL MEDICINE

## 2019-04-02 PROCEDURE — 96160 PT-FOCUSED HLTH RISK ASSMT: CPT | Performed by: INTERNAL MEDICINE

## 2019-04-02 RX ORDER — LOSARTAN POTASSIUM 100 MG/1
100 TABLET ORAL DAILY
Qty: 30 TABLET | Refills: 11 | Status: SHIPPED | OUTPATIENT
Start: 2019-04-02 | End: 2020-04-08

## 2019-04-02 NOTE — PROGRESS NOTES
HPI:    Patient ID: Alan Rosa is a 76year old female. Pt here for a MA supervisit and fub on htn, breast cancer ,htn and Darrian. Generally is feeling well.   Has not yet done colonoscopy    HPI:   Alan Rosa is a 76year old female who presents Fall/Risk Scorin          Depression Screening (PHQ-2/PHQ-9): Over the LAST 2 WEEKS   Little interest or pleasure in doing things (over the last two weeks)?: Not at all    Feeling down, depressed, or hopeless (over the last two weeks)?: Not at all • Fx humerus shaft-closed     surgical repair   • Hypercholesterolemia       Past Surgical History:   Procedure Laterality Date   • HUMERUS FRACTURE SURGERY Right 12/2015    ORIF   • MASTECTOMY RIGHT  2015   • RADIATION RIGHT  2015      Family History rashes, no suspicious lesions sebacous keraosis on left breast  HEENT: atraumatic, normocephalic, ears and throat are clear post nasal drip  Hearing aids     EYES: PERRLA, EOMI, conjunctiva are clear              NECK: supple, no adenopathy, no bruits  MARIAH years No results found for this or any previous visit. No flowsheet data found. Fecal Occult Blood Annually No results found for: FOB No flowsheet data found.     Glaucoma Screening      Ophthalmology Visit Annually     Bone Density Screening      Dexas results found for: DIGOXIN, DIG, VALP No flowsheet data found. Diabetes      HgbA1C  Annually No results found for: A1C No flowsheet data found.     Creat/alb ratio  Annually      LDL  Annually LDL Cholesterol (mg/dL)   Date Value   03/30/2018 99    No f tablet Rfl: 6   atorvastatin 40 MG Oral Tab Take 1 tablet (40 mg total) by mouth nightly. Disp: 90 tablet Rfl: 3   Venlafaxine HCl 37.5 MG Oral Tab Take 1 tablet (37.5 mg total) by mouth daily.  Disp: 90 tablet Rfl: 3   ibuprofen 600 MG Oral Tab Take 600 mg

## 2019-04-04 RX ORDER — ATORVASTATIN CALCIUM 40 MG/1
TABLET, FILM COATED ORAL
Qty: 90 TABLET | Refills: 0 | Status: SHIPPED | OUTPATIENT
Start: 2019-04-04 | End: 2019-07-03

## 2019-04-04 NOTE — PROGRESS NOTES
[facility] Services  INPATIENT PROGRESS NOTE    Length of Stay: 2  Date of Admission: 4/2/2019  Primary Care Physician: Paolo Rey MD    Subjective   Chief Complaint: Dyspnea  HPI:  85 year old male with a history of HTN, COPD, chronic respiratory failure, CAD, and atrial fibrillation not on anticoagulation who presented as a direct admit for dyspnea.  Chest x-ray reveaeld no cardiopulmonary abnormality.  He was initiated on supplemental oxygen, bronchodilators, and steroids.  He states he is feeling better.   He has weaned to 2 LPM nasal cannula.       Review of Systems   Constitutional: Negative for chills and fever.   Respiratory: Positive for cough. Negative for shortness of breath.    Cardiovascular: Negative for chest pain.   Gastrointestinal: Negative for abdominal pain, diarrhea, nausea and vomiting.        All pertinent negatives and positives are as above. All other systems have been reviewed and are negative unless otherwise stated.     Objective    Temp:  [96.7 °F (35.9 °C)-98.2 °F (36.8 °C)] 97.4 °F (36.3 °C)  Heart Rate:  [] 92  Resp:  [16-22] 20  BP: ()/(56-62) 108/62    Physical Exam   Constitutional: He is oriented to person, place, and time. He appears well-developed and well-nourished. No distress.   HENT:   Head: Normocephalic.   Eyes: Conjunctivae are normal.   Cardiovascular: Normal rate, regular rhythm, normal heart sounds and intact distal pulses.   Pulmonary/Chest: Effort normal and breath sounds normal. No respiratory distress. He has no wheezes.   Abdominal: Soft. Bowel sounds are normal. He exhibits no distension. There is no tenderness.   Musculoskeletal: Normal range of motion. He exhibits no edema.   Neurological: He is alert and oriented to person, place, and time.   Skin: Skin is warm and dry. He is not diaphoretic. No erythema.   Vitals reviewed.        Results Review:  I have reviewed the labs, radiology results,  HPI     Klaus Queen is a 76year old female here for f/u of Malignant neoplasm of upper-outer quadrant of right breast in female, estrogen receptor positive (hcc)  Encounter for monitoring aromatase inhibitor therapy  Screening mammogram, encounter f and diagnostic studies.    Laboratory Data:   Results from last 7 days   Lab Units 04/04/19  0613 04/03/19  0618 04/02/19  1528   SODIUM mmol/L 138 137 140   POTASSIUM mmol/L 5.0 5.3* 5.2   CHLORIDE mmol/L 101 100 103   CO2 mmol/L 21.0* 20.0* 24.0   BUN mg/dL 34* 31* 26*   CREATININE mg/dL 1.12 1.33* 1.29*   GLUCOSE mg/dL 160* 175* 121*   CALCIUM mg/dL 9.9 10.2 9.6   ANION GAP mmol/L 16.0 17.0 13.0     Estimated Creatinine Clearance: 53.5 mL/min (by C-G formula based on SCr of 1.12 mg/dL).          Results from last 7 days   Lab Units 04/04/19 0613 04/03/19 0618 04/02/19  1528   WBC 10*3/mm3 24.42* 15.94* 14.54*   HEMOGLOBIN g/dL 13.1 13.5 13.0   HEMATOCRIT % 40.5 42.6 40.4   PLATELETS 10*3/mm3 232 237 201           Culture Data:   Blood Culture   Date Value Ref Range Status   04/02/2019 No growth at 24 hours  Preliminary   04/02/2019 No growth at 24 hours  Preliminary     No results found for: URINECX  No results found for: RESPCX  No results found for: WOUNDCX  No results found for: STOOLCX  No components found for: BODYFLD    Radiology Data:   Imaging Results (last 24 hours)     ** No results found for the last 24 hours. **          I have reviewed the patient's current medications.     Assessment/Plan     Active Hospital Problems    Diagnosis   • **Chronic obstructive pulmonary disease with acute exacerbation (CMS/HCC)   • Obstructive chronic bronchitis without exacerbation (CMS/HCC)   • Chronic heart failure with normal ejection fraction (CMS/[facility])   • Shortness of breath   • Hypertension   • Chronic obstructive lung disease (CMS/HCC)   • Diabetes mellitus (CMS/HCC)   • (HFpEF) heart failure with preserved ejection fraction (CMS/HCC)   • Pulmonary HTN (CMS/HCC)   • Chronic coronary artery disease   • Gastroesophageal reflux disease without esophagitis   • Diabetic neuropathy (CMS/[facility])   • Abdominal hernia       Plan:    Supplemental oxygen, maintain >88%  Bronchodilators  Taper solumedrol to Prednisone 40 mg  daily. Disp: 30 tablet Rfl: 6   LETROZOLE 2.5 MG Oral Tab TAKE 1 TABLET(2.5 MG) BY MOUTH EVERY DAY Disp: 90 tablet Rfl: 6   BuPROPion HCl ER, XL, 150 MG Oral Tablet 24 Hr Take 1 tablet (150 mg total) by mouth daily.  Disp: 30 tablet Rfl: 2   atorvastatin 40 PO daily  Trend WBC  Glucose control: SSI, glipizide  PT/OT        This document has been electronically signed by EVE De La Garza on April 4, 2019 1:15 PM       Smokeless tobacco: Never Used    Substance and Sexual Activity      Alcohol use:  Yes        Alcohol/week: 0.6 oz        Types: 1 Glasses of wine per week        Comment: rarely      Drug use: No      Sexual activity: Not on file    Other Topics      Concer Soft. Bowel sounds are normal. There is no tenderness. Musculoskeletal: She exhibits no edema. Lymphadenopathy:     She has no cervical adenopathy. She has no axillary adenopathy. Right: No supraclavicular adenopathy present.         Left: No past 48 hour(s)). None   No orders of the defined types were placed in this encounter.

## 2019-04-15 DIAGNOSIS — F32.A DEPRESSION, UNSPECIFIED DEPRESSION TYPE: ICD-10-CM

## 2019-04-16 RX ORDER — VENLAFAXINE 37.5 MG/1
TABLET ORAL
Qty: 90 TABLET | Refills: 0 | Status: SHIPPED | OUTPATIENT
Start: 2019-04-16 | End: 2019-07-18

## 2019-05-06 ENCOUNTER — OFFICE VISIT (OUTPATIENT)
Dept: HEMATOLOGY/ONCOLOGY | Facility: HOSPITAL | Age: 69
End: 2019-05-06
Attending: INTERNAL MEDICINE
Payer: MEDICARE

## 2019-05-06 VITALS
HEIGHT: 59 IN | BODY MASS INDEX: 28.02 KG/M2 | WEIGHT: 139 LBS | RESPIRATION RATE: 18 BRPM | TEMPERATURE: 98 F | HEART RATE: 93 BPM | OXYGEN SATURATION: 97 % | SYSTOLIC BLOOD PRESSURE: 130 MMHG | DIASTOLIC BLOOD PRESSURE: 77 MMHG

## 2019-05-06 DIAGNOSIS — C50.411 MALIGNANT NEOPLASM OF UPPER-OUTER QUADRANT OF RIGHT BREAST IN FEMALE, ESTROGEN RECEPTOR POSITIVE (HCC): Primary | ICD-10-CM

## 2019-05-06 DIAGNOSIS — Z79.811 ENCOUNTER FOR MONITORING AROMATASE INHIBITOR THERAPY: ICD-10-CM

## 2019-05-06 DIAGNOSIS — Z17.0 MALIGNANT NEOPLASM OF UPPER-OUTER QUADRANT OF RIGHT BREAST IN FEMALE, ESTROGEN RECEPTOR POSITIVE (HCC): Primary | ICD-10-CM

## 2019-05-06 DIAGNOSIS — Z51.81 ENCOUNTER FOR MONITORING AROMATASE INHIBITOR THERAPY: ICD-10-CM

## 2019-05-06 PROCEDURE — 99214 OFFICE O/P EST MOD 30 MIN: CPT | Performed by: INTERNAL MEDICINE

## 2019-05-06 NOTE — PROGRESS NOTES
HPI   Treva Nieto is a 71year old female here for f/u of Malignant neoplasm of upper-outer quadrant of right breast in female, estrogen receptor positive (hcc)  (primary encounter diagnosis)  Encounter for monitoring aromatase inhibitor therapy    S ATORVASTATIN 40 MG Oral Tab TAKE 1 TABLET(40 MG) BY MOUTH EVERY NIGHT Disp: 90 tablet Rfl: 0   valsartan 80 MG Oral Tab  Disp:  Rfl:    LETROZOLE 2.5 MG Oral Tab TAKE 1 TABLET(2.5 MG) BY MOUTH EVERY DAY Disp: 90 tablet Rfl: 6   ibuprofen 600 MG Oral Tab Ta Types: 1 Glasses of wine per week        Comment: rarely      Drug use: No      Sexual activity: Not on file    Lifestyle      Physical activity:        Days per week: Not on file        Minutes per session: Not on file      Stress: Not on file    Re 11/05/18 : 64 kg (141 lb)  06/29/18 : 61.2 kg (135 lb)  05/14/18 : 63.5 kg (140 lb)  03/30/18 : 61.2 kg (135 lb)      Physical Exam   Constitutional: She appears well-developed and well-nourished.    HENT:   Mouth/Throat: Oropharynx is clear and moist. No o Oncotype Dx RS of 19. No additional benefit from chemo. Will continue with the femara  4 yrs of femara alone (5 yr total adjuvant, until August of 2019). Will discuss 7-10 yrs pending DEXA which has been scheduled for later this month.      Osteopenia:

## 2019-05-08 ENCOUNTER — HOSPITAL ENCOUNTER (OUTPATIENT)
Dept: MAMMOGRAPHY | Facility: HOSPITAL | Age: 69
Discharge: HOME | End: 2019-05-08
Attending: INTERNAL MEDICINE
Payer: MEDICARE

## 2019-05-08 ENCOUNTER — HOSPITAL ENCOUNTER (OUTPATIENT)
Dept: BONE DENSITY | Facility: HOSPITAL | Age: 69
Discharge: HOME | End: 2019-05-08
Attending: INTERNAL MEDICINE
Payer: MEDICARE

## 2019-05-08 DIAGNOSIS — M81.0 OSTEOPOROSIS OF LUMBAR SPINE: ICD-10-CM

## 2019-05-08 DIAGNOSIS — Z90.11 S/P RIGHT MASTECTOMY: ICD-10-CM

## 2019-05-08 DIAGNOSIS — Z12.31 SCREENING MAMMOGRAM, ENCOUNTER FOR: ICD-10-CM

## 2019-05-08 PROCEDURE — 77067 SCR MAMMO BI INCL CAD: CPT | Performed by: INTERNAL MEDICINE

## 2019-05-08 PROCEDURE — 77063 BREAST TOMOSYNTHESIS BI: CPT | Performed by: INTERNAL MEDICINE

## 2019-05-08 PROCEDURE — 77080 DXA BONE DENSITY AXIAL: CPT | Performed by: INTERNAL MEDICINE

## 2019-07-03 RX ORDER — ATORVASTATIN CALCIUM 40 MG/1
TABLET, FILM COATED ORAL
Qty: 90 TABLET | Refills: 0 | Status: SHIPPED | OUTPATIENT
Start: 2019-07-03 | End: 2019-09-30

## 2019-07-17 DIAGNOSIS — F32.A DEPRESSION, UNSPECIFIED DEPRESSION TYPE: ICD-10-CM

## 2019-07-18 DIAGNOSIS — F32.A DEPRESSION, UNSPECIFIED DEPRESSION TYPE: ICD-10-CM

## 2019-07-19 RX ORDER — VENLAFAXINE 37.5 MG/1
TABLET ORAL
Qty: 90 TABLET | Refills: 0 | Status: SHIPPED | OUTPATIENT
Start: 2019-07-19 | End: 2019-09-03

## 2019-07-19 RX ORDER — VENLAFAXINE 37.5 MG/1
TABLET ORAL
Qty: 90 TABLET | Refills: 0 | Status: SHIPPED | OUTPATIENT
Start: 2019-07-19 | End: 2019-10-16

## 2019-09-03 ENCOUNTER — OFFICE VISIT (OUTPATIENT)
Dept: INTERNAL MEDICINE CLINIC | Facility: CLINIC | Age: 69
End: 2019-09-03
Payer: COMMERCIAL

## 2019-09-03 VITALS — HEART RATE: 89 BPM | OXYGEN SATURATION: 97 % | WEIGHT: 134.81 LBS | BODY MASS INDEX: 27.18 KG/M2 | HEIGHT: 59 IN

## 2019-09-03 DIAGNOSIS — E78.2 MIXED HYPERLIPIDEMIA: ICD-10-CM

## 2019-09-03 DIAGNOSIS — Z12.4 PAP SMEAR FOR CERVICAL CANCER SCREENING: Primary | ICD-10-CM

## 2019-09-03 DIAGNOSIS — R73.9 HYPERGLYCEMIA: ICD-10-CM

## 2019-09-03 PROCEDURE — 99214 OFFICE O/P EST MOD 30 MIN: CPT | Performed by: INTERNAL MEDICINE

## 2019-09-03 NOTE — PROGRESS NOTES
HPI:    Patient ID: Sagrario Velásquez is a 71year old female. Patient here for fu on lipids, hyperlgycemia and for an overdue PAp smear. No vaginal symptoms other than dryness. Has been trying to watch diet more carefully.     Review of Systems   Consti noted.   Psychiatric: She has a normal mood and affect.               ASSESSMENT/PLAN:   Pap smear for cervical cancer screening  (primary encounter diagnosis) -will notify  Mixed hyperlipidemia on statin recheck  Hyperglycemia will notify of results    No

## 2019-09-04 LAB
BUN: 11 MG/DL (ref 7–25)
CALCIUM: 9.5 MG/DL (ref 8.6–10.4)
CARBON DIOXIDE: 29 MMOL/L (ref 20–32)
CHLORIDE: 105 MMOL/L (ref 98–110)
CHOL/HDLC RATIO: 4.7 (CALC)
CHOLESTEROL, TOTAL: 197 MG/DL
CREATININE: 0.83 MG/DL (ref 0.5–0.99)
EGFR IF AFRICN AM: 83 ML/MIN/1.73M2
EGFR IF NONAFRICN AM: 72 ML/MIN/1.73M2
GLUCOSE: 100 MG/DL (ref 65–99)
HDL CHOLESTEROL: 42 MG/DL
HEMOGLOBIN A1C: 5.4 % OF TOTAL HGB
LDL-CHOLESTEROL: 116 MG/DL (CALC)
NON-HDL CHOLESTEROL: 155 MG/DL (CALC)
POTASSIUM: 4.6 MMOL/L (ref 3.5–5.3)
SODIUM: 141 MMOL/L (ref 135–146)
TRIGLYCERIDES: 274 MG/DL

## 2019-09-30 RX ORDER — LETROZOLE 2.5 MG/1
TABLET, FILM COATED ORAL
Qty: 90 TABLET | Refills: 3 | Status: SHIPPED | OUTPATIENT
Start: 2019-09-30 | End: 2019-11-07

## 2019-09-30 RX ORDER — ATORVASTATIN CALCIUM 40 MG/1
TABLET, FILM COATED ORAL
Qty: 90 TABLET | Refills: 0 | Status: SHIPPED | OUTPATIENT
Start: 2019-09-30 | End: 2019-12-26

## 2019-10-16 DIAGNOSIS — F32.A DEPRESSION, UNSPECIFIED DEPRESSION TYPE: ICD-10-CM

## 2019-10-17 NOTE — TELEPHONE ENCOUNTER
Requested Prescriptions     Pending Prescriptions Disp Refills   • VENLAFAXINE HCL 37.5 MG Oral Tab [Pharmacy Med Name: VENLAFAXINE 37.5MG TABLETS] 90 tablet 0     Sig: TAKE 1 TABLET(37.5 MG) BY MOUTH DAILY     Last office visit: 9-3-19  Medication last re

## 2019-10-18 RX ORDER — VENLAFAXINE 37.5 MG/1
TABLET ORAL
Qty: 90 TABLET | Refills: 0 | Status: SHIPPED | OUTPATIENT
Start: 2019-10-18 | End: 2020-08-18

## 2019-10-27 DIAGNOSIS — F32.A DEPRESSION, UNSPECIFIED DEPRESSION TYPE: ICD-10-CM

## 2019-10-28 RX ORDER — VENLAFAXINE 37.5 MG/1
TABLET ORAL
Qty: 90 TABLET | Refills: 0 | Status: SHIPPED | OUTPATIENT
Start: 2019-10-28 | End: 2019-11-07

## 2019-11-07 ENCOUNTER — OFFICE VISIT (OUTPATIENT)
Dept: HEMATOLOGY/ONCOLOGY | Facility: HOSPITAL | Age: 69
End: 2019-11-07
Attending: INTERNAL MEDICINE
Payer: MEDICARE

## 2019-11-07 VITALS
WEIGHT: 137 LBS | TEMPERATURE: 98 F | BODY MASS INDEX: 27.62 KG/M2 | SYSTOLIC BLOOD PRESSURE: 134 MMHG | HEART RATE: 81 BPM | DIASTOLIC BLOOD PRESSURE: 85 MMHG | OXYGEN SATURATION: 96 % | RESPIRATION RATE: 18 BRPM | HEIGHT: 59 IN

## 2019-11-07 DIAGNOSIS — Z51.81 ENCOUNTER FOR MONITORING AROMATASE INHIBITOR THERAPY: ICD-10-CM

## 2019-11-07 DIAGNOSIS — C50.411 MALIGNANT NEOPLASM OF UPPER-OUTER QUADRANT OF RIGHT BREAST IN FEMALE, ESTROGEN RECEPTOR POSITIVE (HCC): Primary | ICD-10-CM

## 2019-11-07 DIAGNOSIS — Z12.31 SCREENING MAMMOGRAM, ENCOUNTER FOR: ICD-10-CM

## 2019-11-07 DIAGNOSIS — Z79.811 ENCOUNTER FOR MONITORING AROMATASE INHIBITOR THERAPY: ICD-10-CM

## 2019-11-07 DIAGNOSIS — Z90.11 S/P RIGHT MASTECTOMY: ICD-10-CM

## 2019-11-07 DIAGNOSIS — Z17.0 MALIGNANT NEOPLASM OF UPPER-OUTER QUADRANT OF RIGHT BREAST IN FEMALE, ESTROGEN RECEPTOR POSITIVE (HCC): Primary | ICD-10-CM

## 2019-11-07 DIAGNOSIS — M81.0 OSTEOPOROSIS, UNSPECIFIED OSTEOPOROSIS TYPE, UNSPECIFIED PATHOLOGICAL FRACTURE PRESENCE: ICD-10-CM

## 2019-11-07 PROCEDURE — 99214 OFFICE O/P EST MOD 30 MIN: CPT | Performed by: INTERNAL MEDICINE

## 2019-11-07 NOTE — PROGRESS NOTES
DAVE   Marilin Corral is a 71year old female here for f/u of Malignant neoplasm of upper-outer quadrant of right breast in female, estrogen receptor positive (hcc)  (primary encounter diagnosis)  S/p right mastectomy  Screening mammogram, encounter for MG) BY MOUTH DAILY 90 tablet 0   • ATORVASTATIN 40 MG Oral Tab TAKE 1 TABLET(40 MG) BY MOUTH EVERY NIGHT 90 tablet 0   • LETROZOLE 2.5 MG Oral Tab TAKE 1 TABLET(2.5 MG) BY MOUTH EVERY DAY 90 tablet 3   • losartan 100 MG Oral Tab Take 1 tablet (100 mg total lb)  06/29/18 : 61.2 kg (135 lb)    General: Patient is alert, not in acute distress. HEENT: EOMs intact. PERRL. Oropharynx is clear. Neck: No JVD. No palpable lymphadenopathy. Neck is supple. Chest: Clear to auscultation.   R breast mastectomy, L breas Continue calcium, vitamin D and weight bearing exercises. Left breast mammogram due in May 2020. Lymphedema of the RUE:  Continue with home massages. Advised to quit smoking. No orders of the defined types were placed in this encounter. neck or spine after appropriate evaluation to exclude secondary causes.   * Low bone mass (T score between -1.0 and -2.5 at the femoral neck or spine) and a 10-year probability of a hip fracture > 3% or a 10-year probability of a major osteoporosis-related

## 2019-12-26 RX ORDER — ATORVASTATIN CALCIUM 40 MG/1
TABLET, FILM COATED ORAL
Qty: 90 TABLET | Refills: 0 | Status: SHIPPED | OUTPATIENT
Start: 2019-12-26 | End: 2020-03-16

## 2020-03-16 RX ORDER — ATORVASTATIN CALCIUM 40 MG/1
TABLET, FILM COATED ORAL
Qty: 90 TABLET | Refills: 0 | Status: SHIPPED | OUTPATIENT
Start: 2020-03-16 | End: 2020-03-25

## 2020-03-25 RX ORDER — ATORVASTATIN CALCIUM 40 MG/1
TABLET, FILM COATED ORAL
Qty: 90 TABLET | Refills: 0 | Status: SHIPPED | OUTPATIENT
Start: 2020-03-25 | End: 2020-06-23

## 2020-04-08 RX ORDER — LOSARTAN POTASSIUM 100 MG/1
TABLET ORAL
Qty: 30 TABLET | Refills: 11 | Status: SHIPPED | OUTPATIENT
Start: 2020-04-08 | End: 2020-08-18

## 2020-05-28 ENCOUNTER — APPOINTMENT (OUTPATIENT)
Dept: HEMATOLOGY/ONCOLOGY | Facility: HOSPITAL | Age: 70
End: 2020-05-28
Attending: INTERNAL MEDICINE
Payer: MEDICARE

## 2020-06-23 RX ORDER — ATORVASTATIN CALCIUM 40 MG/1
TABLET, FILM COATED ORAL
Qty: 90 TABLET | Refills: 0 | Status: SHIPPED | OUTPATIENT
Start: 2020-06-23 | End: 2020-08-18

## 2020-06-24 ENCOUNTER — TELEPHONE (OUTPATIENT)
Dept: INTERNAL MEDICINE CLINIC | Facility: CLINIC | Age: 70
End: 2020-06-24

## 2020-07-08 ENCOUNTER — TELEPHONE (OUTPATIENT)
Dept: INTERNAL MEDICINE CLINIC | Facility: CLINIC | Age: 70
End: 2020-07-08

## 2020-07-14 ENCOUNTER — TELEPHONE (OUTPATIENT)
Dept: INTERNAL MEDICINE CLINIC | Facility: CLINIC | Age: 70
End: 2020-07-14

## 2020-08-13 ENCOUNTER — HOSPITAL ENCOUNTER (OUTPATIENT)
Dept: MAMMOGRAPHY | Facility: HOSPITAL | Age: 70
Discharge: HOME | End: 2020-08-13
Attending: INTERNAL MEDICINE
Payer: MEDICARE

## 2020-08-13 DIAGNOSIS — Z90.11 S/P RIGHT MASTECTOMY: ICD-10-CM

## 2020-08-13 DIAGNOSIS — Z12.31 SCREENING MAMMOGRAM, ENCOUNTER FOR: ICD-10-CM

## 2020-08-13 PROCEDURE — 77063 BREAST TOMOSYNTHESIS BI: CPT | Performed by: INTERNAL MEDICINE

## 2020-08-13 PROCEDURE — 77067 SCR MAMMO BI INCL CAD: CPT | Performed by: INTERNAL MEDICINE

## 2020-08-18 ENCOUNTER — OFFICE VISIT (OUTPATIENT)
Dept: INTERNAL MEDICINE CLINIC | Facility: CLINIC | Age: 70
End: 2020-08-18
Payer: COMMERCIAL

## 2020-08-18 VITALS
OXYGEN SATURATION: 98 % | DIASTOLIC BLOOD PRESSURE: 72 MMHG | WEIGHT: 128 LBS | HEART RATE: 100 BPM | HEIGHT: 59 IN | BODY MASS INDEX: 25.8 KG/M2 | SYSTOLIC BLOOD PRESSURE: 130 MMHG

## 2020-08-18 DIAGNOSIS — Z00.00 MEDICARE ANNUAL WELLNESS VISIT, SUBSEQUENT: Primary | ICD-10-CM

## 2020-08-18 DIAGNOSIS — E78.2 MIXED HYPERLIPIDEMIA: ICD-10-CM

## 2020-08-18 DIAGNOSIS — Z85.3 HX OF BREAST CANCER: ICD-10-CM

## 2020-08-18 DIAGNOSIS — I10 ESSENTIAL HYPERTENSION: ICD-10-CM

## 2020-08-18 PROCEDURE — 3008F BODY MASS INDEX DOCD: CPT | Performed by: INTERNAL MEDICINE

## 2020-08-18 PROCEDURE — 96160 PT-FOCUSED HLTH RISK ASSMT: CPT | Performed by: INTERNAL MEDICINE

## 2020-08-18 PROCEDURE — G0439 PPPS, SUBSEQ VISIT: HCPCS | Performed by: INTERNAL MEDICINE

## 2020-08-18 PROCEDURE — 99397 PER PM REEVAL EST PAT 65+ YR: CPT | Performed by: INTERNAL MEDICINE

## 2020-08-18 PROCEDURE — 3075F SYST BP GE 130 - 139MM HG: CPT | Performed by: INTERNAL MEDICINE

## 2020-08-18 PROCEDURE — 3078F DIAST BP <80 MM HG: CPT | Performed by: INTERNAL MEDICINE

## 2020-08-18 RX ORDER — LOSARTAN POTASSIUM 100 MG/1
100 TABLET ORAL DAILY
Qty: 90 TABLET | Refills: 3 | Status: SHIPPED | OUTPATIENT
Start: 2020-08-18 | End: 2021-11-01

## 2020-08-18 RX ORDER — ATORVASTATIN CALCIUM 40 MG/1
40 TABLET, FILM COATED ORAL NIGHTLY
Qty: 90 TABLET | Refills: 3 | Status: SHIPPED | OUTPATIENT
Start: 2020-08-18 | End: 2021-04-15

## 2020-08-18 NOTE — PROGRESS NOTES
HPI:    Patient ID: Earnest Silveira is a 79year old female. HPI Pt here for a MA supervisit and fu on breast cancer, htn, hyperlipidemia and depression. Is due for fasting labs.     HPI:   Earnest Silveira is a 79year old female who presents for a MA S • CVA (cerebral infarction)     aphasia   • Depression    • Fx humerus shaft-closed     surgical repair   • Hypercholesterolemia       Past Surgical History:   Procedure Laterality Date   • HUMERUS FRACTURE SURGERY Right 12/2015    ORIF   • MASTECTOMY RI 134/85  05/06/19 : 130/77      GENERAL: well developed, overweight in no apparent distress  SKIN: no rashes, no suspicious lesions  HEENT: atraumatic, normocephalic, ears and throat are clear   Hard of hearing - aid in the right ear  EYES: VANDANA RICHEY, co visit. No flowsheet data found. Fecal Occult Blood Annually No results found for: FOB No flowsheet data found.     Glaucoma Screening      Ophthalmology Visit Annually     Bone Density Screening      Dexascan Every two years Last Dexa Scan:   XR DEXA DAISY Date Value   09/03/2019 5.4    No flowsheet data found. Creat/alb ratio  Annually      LDL  Annually LDL-CHOLESTEROL (mg/dL (calc))   Date Value   09/03/2019 116 (H)    No flowsheet data found. Dilated Eye exam  Annually No flowsheet data found. LOSARTAN 100 MG Oral Tab TAKE 1 TABLET(100 MG) BY MOUTH DAILY 30 tablet 11   • ibuprofen 600 MG Oral Tab Take 600 mg by mouth every 6 (six) hours as needed.        • Calcium Carbonate-Vitamin D (CALCIUM-D) 600-400 MG-UNIT Oral Tab Take 1 tablet by mouth nestor

## 2020-08-26 ENCOUNTER — OFFICE VISIT (OUTPATIENT)
Dept: HEMATOLOGY/ONCOLOGY | Facility: HOSPITAL | Age: 70
End: 2020-08-26
Attending: INTERNAL MEDICINE
Payer: MEDICARE

## 2020-08-26 VITALS
BODY MASS INDEX: 26 KG/M2 | OXYGEN SATURATION: 98 % | HEART RATE: 82 BPM | DIASTOLIC BLOOD PRESSURE: 98 MMHG | RESPIRATION RATE: 16 BRPM | WEIGHT: 129 LBS | HEIGHT: 59 IN | TEMPERATURE: 99 F | SYSTOLIC BLOOD PRESSURE: 123 MMHG

## 2020-08-26 DIAGNOSIS — Z90.11 S/P RIGHT MASTECTOMY: ICD-10-CM

## 2020-08-26 DIAGNOSIS — M81.0 OSTEOPOROSIS, UNSPECIFIED OSTEOPOROSIS TYPE, UNSPECIFIED PATHOLOGICAL FRACTURE PRESENCE: ICD-10-CM

## 2020-08-26 DIAGNOSIS — Z85.3 HISTORY OF RIGHT BREAST CANCER: Primary | ICD-10-CM

## 2020-08-26 DIAGNOSIS — Z08 ENCOUNTER FOR FOLLOW-UP SURVEILLANCE OF BREAST CANCER: ICD-10-CM

## 2020-08-26 DIAGNOSIS — Z85.3 ENCOUNTER FOR FOLLOW-UP SURVEILLANCE OF BREAST CANCER: ICD-10-CM

## 2020-08-26 DIAGNOSIS — Z12.31 SCREENING MAMMOGRAM, ENCOUNTER FOR: ICD-10-CM

## 2020-08-26 PROCEDURE — 99213 OFFICE O/P EST LOW 20 MIN: CPT | Performed by: INTERNAL MEDICINE

## 2020-08-26 NOTE — PROGRESS NOTES
HPI   Galina Brooks is a 79year old female here for f/u of History of right breast cancer  (primary encounter diagnosis)  Encounter for follow-up surveillance of breast cancer  S/p right mastectomy  Screening mammogram, encounter for  Osteoporosis, un • losartan 100 MG Oral Tab Take 1 tablet (100 mg total) by mouth daily. 90 tablet 3   • ibuprofen 600 MG Oral Tab Take 600 mg by mouth every 6 (six) hours as needed.        • Calcium Carbonate-Vitamin D (CALCIUM-D) 600-400 MG-UNIT Oral Tab Take 1 tablet b supple. Chest: Clear to auscultation. R breast mastectomy, L breast no changes. Heart: Regular rate and rhythm. Abdomen: Soft, non tender with good bowel sounds. Extremities: No edema. Neurological: Grossly intact. Lymphatics:  There is no palpable (from the past 48 hour(s)). None   No orders of the defined types were placed in this encounter.     PROCEDURE: [facility] 2D+3D SCREENING LEFT ([id number])   COMPARISON: [facility], [place], [place] DIAGNOSTIC LEFT (ZWM=[id number]), 5/03/2017,

## 2020-09-21 ENCOUNTER — NURSE ONLY (OUTPATIENT)
Dept: INTERNAL MEDICINE CLINIC | Facility: CLINIC | Age: 70
End: 2020-09-21
Payer: COMMERCIAL

## 2020-09-21 PROCEDURE — 36415 COLL VENOUS BLD VENIPUNCTURE: CPT | Performed by: INTERNAL MEDICINE

## 2020-09-22 LAB
ABSOLUTE BASOPHILS: 60 CELLS/UL (ref 0–200)
ABSOLUTE EOSINOPHILS: 111 CELLS/UL (ref 15–500)
ABSOLUTE LYMPHOCYTES: 2729 CELLS/UL (ref 850–3900)
ABSOLUTE MONOCYTES: 349 CELLS/UL (ref 200–950)
ABSOLUTE NEUTROPHILS: 5253 CELLS/UL (ref 1500–7800)
ALBUMIN/GLOBULIN RATIO: 2.2 (CALC) (ref 1–2.5)
ALBUMIN: 4.4 G/DL (ref 3.6–5.1)
ALKALINE PHOSPHATASE: 90 U/L (ref 37–153)
ALT: 17 U/L (ref 6–29)
AST: 16 U/L (ref 10–35)
BASOPHILS: 0.7 %
BILIRUBIN, TOTAL: 0.6 MG/DL (ref 0.2–1.2)
BUN: 16 MG/DL (ref 7–25)
CALCIUM: 9.4 MG/DL (ref 8.6–10.4)
CARBON DIOXIDE: 25 MMOL/L (ref 20–32)
CHLORIDE: 105 MMOL/L (ref 98–110)
CHOL/HDLC RATIO: 4.6 (CALC)
CHOLESTEROL, TOTAL: 185 MG/DL
CREATININE: 0.71 MG/DL (ref 0.6–0.93)
EGFR IF AFRICN AM: 100 ML/MIN/1.73M2
EGFR IF NONAFRICN AM: 86 ML/MIN/1.73M2
EOSINOPHILS: 1.3 %
GLOBULIN: 2 G/DL (CALC) (ref 1.9–3.7)
GLUCOSE: 84 MG/DL (ref 65–99)
HDL CHOLESTEROL: 40 MG/DL
HEMATOCRIT: 44.1 % (ref 35–45)
HEMOGLOBIN: 15.1 G/DL (ref 11.7–15.5)
LDL-CHOLESTEROL: 119 MG/DL (CALC)
LYMPHOCYTES: 32.1 %
MCH: 30.5 PG (ref 27–33)
MCHC: 34.2 G/DL (ref 32–36)
MCV: 89.1 FL (ref 80–100)
MONOCYTES: 4.1 %
MPV: 10.1 FL (ref 7.5–12.5)
NEUTROPHILS: 61.8 %
NON-HDL CHOLESTEROL: 145 MG/DL (CALC)
PLATELET COUNT: 228 THOUSAND/UL (ref 140–400)
POTASSIUM: 4.3 MMOL/L (ref 3.5–5.3)
PROTEIN, TOTAL: 6.4 G/DL (ref 6.1–8.1)
RDW: 13 % (ref 11–15)
RED BLOOD CELL COUNT: 4.95 MILLION/UL (ref 3.8–5.1)
SODIUM: 140 MMOL/L (ref 135–146)
TRIGLYCERIDES: 150 MG/DL
WHITE BLOOD CELL COUNT: 8.5 THOUSAND/UL (ref 3.8–10.8)

## 2020-09-29 ENCOUNTER — OFFICE VISIT (OUTPATIENT)
Dept: ENDOCRINOLOGY CLINIC | Facility: CLINIC | Age: 70
End: 2020-09-29
Payer: COMMERCIAL

## 2020-09-29 VITALS
SYSTOLIC BLOOD PRESSURE: 124 MMHG | WEIGHT: 129.38 LBS | BODY MASS INDEX: 26 KG/M2 | HEART RATE: 80 BPM | DIASTOLIC BLOOD PRESSURE: 87 MMHG

## 2020-09-29 DIAGNOSIS — M81.0 AGE-RELATED OSTEOPOROSIS WITHOUT CURRENT PATHOLOGICAL FRACTURE: Primary | ICD-10-CM

## 2020-09-29 PROCEDURE — 99203 OFFICE O/P NEW LOW 30 MIN: CPT | Performed by: INTERNAL MEDICINE

## 2020-09-29 PROCEDURE — 3079F DIAST BP 80-89 MM HG: CPT | Performed by: INTERNAL MEDICINE

## 2020-09-29 PROCEDURE — 3074F SYST BP LT 130 MM HG: CPT | Performed by: INTERNAL MEDICINE

## 2020-09-29 NOTE — H&P
New Patient Evaluation - History and Physical    CONSULT - Reason for Visit:  Osteoporosis. Requesting Physician: Dr. Hoda Blas.     CHIEF COMPLAINT:  Osteoporosis       HISTORY OF PRESENT ILLNESS:   Sagrario Velásquez is a 79year old female who presents with O Alcohol  Doesn't drink    o Daily calcium intake (food and supplements)  Takes supplements and in diet    o Daily exercise  No lifting weights, but walks up and down stairs    FAMILY HISTORY   Family History   Problem Relation Age of Onset   • Arthritis Mo anxiety  Hematology/Lymphatics: Negative for: bruising, lower extremity edema  Endocrine: Negative for: polyuria, polydypsia  Skin: Negative for: rash, blister, cellulitis,      PHYSICAL EXAM:    09/29/20  1314   BP: 124/87   Pulse: 80   Weight: 129 lb 6.4 of breast cancer. I would like to try Reclast. I have explained to her the side effects and risks and benefits. She would like to think about It.  If she decided that she would like to start, I have ordered the following:  -25-OH Vitamin D, urinary NTX, ser

## 2021-02-02 DIAGNOSIS — Z23 NEED FOR VACCINATION: ICD-10-CM

## 2021-03-18 ENCOUNTER — TELEPHONE (OUTPATIENT)
Dept: CASE MANAGEMENT | Age: 71
End: 2021-03-18

## 2021-04-15 ENCOUNTER — OFFICE VISIT (OUTPATIENT)
Dept: INTERNAL MEDICINE CLINIC | Facility: CLINIC | Age: 71
End: 2021-04-15
Payer: COMMERCIAL

## 2021-04-15 VITALS
WEIGHT: 126.81 LBS | BODY MASS INDEX: 25.56 KG/M2 | DIASTOLIC BLOOD PRESSURE: 68 MMHG | HEIGHT: 59 IN | SYSTOLIC BLOOD PRESSURE: 100 MMHG | HEART RATE: 107 BPM | OXYGEN SATURATION: 96 %

## 2021-04-15 DIAGNOSIS — C50.411 MALIGNANT NEOPLASM OF UPPER-OUTER QUADRANT OF RIGHT BREAST IN FEMALE, ESTROGEN RECEPTOR POSITIVE (HCC): ICD-10-CM

## 2021-04-15 DIAGNOSIS — F32.A DEPRESSION, UNSPECIFIED DEPRESSION TYPE: ICD-10-CM

## 2021-04-15 DIAGNOSIS — E55.9 VITAMIN D DEFICIENCY: ICD-10-CM

## 2021-04-15 DIAGNOSIS — E78.2 MIXED HYPERLIPIDEMIA: Primary | ICD-10-CM

## 2021-04-15 DIAGNOSIS — Z17.0 MALIGNANT NEOPLASM OF UPPER-OUTER QUADRANT OF RIGHT BREAST IN FEMALE, ESTROGEN RECEPTOR POSITIVE (HCC): ICD-10-CM

## 2021-04-15 PROBLEM — J41.0 SMOKERS' COUGH (HCC): Chronic | Status: ACTIVE | Noted: 2021-04-15

## 2021-04-15 PROCEDURE — 82306 VITAMIN D 25 HYDROXY: CPT | Performed by: INTERNAL MEDICINE

## 2021-04-15 PROCEDURE — 3078F DIAST BP <80 MM HG: CPT | Performed by: INTERNAL MEDICINE

## 2021-04-15 PROCEDURE — 85025 COMPLETE CBC W/AUTO DIFF WBC: CPT | Performed by: INTERNAL MEDICINE

## 2021-04-15 PROCEDURE — 80061 LIPID PANEL: CPT | Performed by: INTERNAL MEDICINE

## 2021-04-15 PROCEDURE — 80053 COMPREHEN METABOLIC PANEL: CPT | Performed by: INTERNAL MEDICINE

## 2021-04-15 PROCEDURE — 99214 OFFICE O/P EST MOD 30 MIN: CPT | Performed by: INTERNAL MEDICINE

## 2021-04-15 PROCEDURE — 3008F BODY MASS INDEX DOCD: CPT | Performed by: INTERNAL MEDICINE

## 2021-04-15 PROCEDURE — 3074F SYST BP LT 130 MM HG: CPT | Performed by: INTERNAL MEDICINE

## 2021-04-15 RX ORDER — ATORVASTATIN CALCIUM 40 MG/1
40 TABLET, FILM COATED ORAL NIGHTLY
Qty: 90 TABLET | Refills: 3 | Status: SHIPPED | OUTPATIENT
Start: 2021-04-15 | End: 2021-04-15

## 2021-04-15 RX ORDER — ATORVASTATIN CALCIUM 40 MG/1
40 TABLET, FILM COATED ORAL NIGHTLY
Qty: 90 TABLET | Refills: 3 | Status: SHIPPED | OUTPATIENT
Start: 2021-04-15 | End: 2021-12-03

## 2021-04-15 NOTE — PROGRESS NOTES
HPI/Subjective:   Patient ID: Cayden Wilson is a 79year old female. HPI Pt here for clinical fu on breast ca, htn, depression and hyperlipidemia. Is doing well overall.     History/Other:   Review of Systems   Constitutional: Negative for fatigue and abdominal tenderness. Musculoskeletal:         General: No swelling. Cervical back: Normal range of motion and neck supple. Skin:     Coloration: Skin is not jaundiced. Findings: No erythema.    Neurological:      Mental Status: She is alert a

## 2021-08-26 ENCOUNTER — APPOINTMENT (OUTPATIENT)
Dept: HEMATOLOGY/ONCOLOGY | Facility: HOSPITAL | Age: 71
End: 2021-08-26
Attending: INTERNAL MEDICINE
Payer: MEDICARE

## 2021-09-13 ENCOUNTER — TELEPHONE (OUTPATIENT)
Dept: INTERNAL MEDICINE CLINIC | Facility: CLINIC | Age: 71
End: 2021-09-13

## 2021-11-01 RX ORDER — LOSARTAN POTASSIUM 100 MG/1
TABLET ORAL
Qty: 90 TABLET | Refills: 3 | Status: SHIPPED | OUTPATIENT
Start: 2021-11-01 | End: 2021-12-03

## 2021-11-01 NOTE — TELEPHONE ENCOUNTER
A refill request was received for:  Requested Prescriptions     Pending Prescriptions Disp Refills   • LOSARTAN 100 MG Oral Tab [Pharmacy Med Name: LOSARTAN 100MG TABLETS] 90 tablet 3     Sig: TAKE 1 TABLET(100 MG) BY MOUTH DAILY     Last refill date: 8/18

## 2021-11-10 ENCOUNTER — TELEPHONE (OUTPATIENT)
Dept: INTERNAL MEDICINE CLINIC | Facility: CLINIC | Age: 71
End: 2021-11-10

## 2021-11-23 ENCOUNTER — TELEPHONE (OUTPATIENT)
Dept: INTERNAL MEDICINE CLINIC | Facility: CLINIC | Age: 71
End: 2021-11-23

## 2021-11-23 NOTE — TELEPHONE ENCOUNTER
Pt stated that her car isn't driveable at this time, but she will try to get someone to bring her in before the end of the year.

## 2021-12-03 ENCOUNTER — TELEPHONE (OUTPATIENT)
Dept: INTERNAL MEDICINE CLINIC | Facility: CLINIC | Age: 71
End: 2021-12-03

## 2021-12-03 RX ORDER — LOSARTAN POTASSIUM 100 MG/1
100 TABLET ORAL DAILY
Qty: 90 TABLET | Refills: 3 | Status: SHIPPED | OUTPATIENT
Start: 2021-12-03

## 2021-12-03 RX ORDER — ATORVASTATIN CALCIUM 40 MG/1
40 TABLET, FILM COATED ORAL NIGHTLY
Qty: 90 TABLET | Refills: 3 | Status: SHIPPED | OUTPATIENT
Start: 2021-12-03

## 2021-12-03 NOTE — TELEPHONE ENCOUNTER
A refill request was received for:  Requested Prescriptions     Pending Prescriptions Disp Refills   • losartan 100 MG Oral Tab 90 tablet 3     Sig: Take 1 tablet (100 mg total) by mouth daily.    • atorvastatin 40 MG Oral Tab 90 tablet 3     Sig: Take 1 ta

## 2021-12-03 NOTE — TELEPHONE ENCOUNTER
Pt called because her refills should go thru [facility] home delivery service from here on out. Pt doesn't need any refill at this time.

## 2022-02-01 ENCOUNTER — TELEPHONE (OUTPATIENT)
Dept: INTERNAL MEDICINE CLINIC | Facility: CLINIC | Age: 72
End: 2022-02-01

## 2022-03-28 ENCOUNTER — TELEPHONE (OUTPATIENT)
Dept: INTERNAL MEDICINE CLINIC | Facility: CLINIC | Age: 72
End: 2022-03-28

## 2022-04-12 ENCOUNTER — NURSE ONLY (OUTPATIENT)
Dept: INTERNAL MEDICINE CLINIC | Facility: CLINIC | Age: 72
End: 2022-04-12
Payer: COMMERCIAL

## 2022-04-21 ENCOUNTER — OFFICE VISIT (OUTPATIENT)
Dept: INTERNAL MEDICINE CLINIC | Facility: CLINIC | Age: 72
End: 2022-04-21
Payer: COMMERCIAL

## 2022-04-21 VITALS
HEIGHT: 59 IN | WEIGHT: 131 LBS | DIASTOLIC BLOOD PRESSURE: 70 MMHG | BODY MASS INDEX: 26.41 KG/M2 | OXYGEN SATURATION: 98 % | SYSTOLIC BLOOD PRESSURE: 110 MMHG | HEART RATE: 110 BPM

## 2022-04-21 DIAGNOSIS — F41.1 GAD (GENERALIZED ANXIETY DISORDER): ICD-10-CM

## 2022-04-21 DIAGNOSIS — Z17.0 MALIGNANT NEOPLASM OF UPPER-OUTER QUADRANT OF RIGHT BREAST IN FEMALE, ESTROGEN RECEPTOR POSITIVE (HCC): ICD-10-CM

## 2022-04-21 DIAGNOSIS — Z85.3 HISTORY OF BREAST CANCER: ICD-10-CM

## 2022-04-21 DIAGNOSIS — Z00.00 MEDICARE ANNUAL WELLNESS VISIT, SUBSEQUENT: Primary | ICD-10-CM

## 2022-04-21 DIAGNOSIS — E78.2 MIXED HYPERLIPIDEMIA: ICD-10-CM

## 2022-04-21 DIAGNOSIS — C50.411 MALIGNANT NEOPLASM OF UPPER-OUTER QUADRANT OF RIGHT BREAST IN FEMALE, ESTROGEN RECEPTOR POSITIVE (HCC): ICD-10-CM

## 2022-04-21 DIAGNOSIS — I10 ESSENTIAL HYPERTENSION: ICD-10-CM

## 2022-04-21 PROCEDURE — 3008F BODY MASS INDEX DOCD: CPT | Performed by: INTERNAL MEDICINE

## 2022-04-21 PROCEDURE — 99397 PER PM REEVAL EST PAT 65+ YR: CPT | Performed by: INTERNAL MEDICINE

## 2022-04-21 PROCEDURE — 3078F DIAST BP <80 MM HG: CPT | Performed by: INTERNAL MEDICINE

## 2022-04-21 PROCEDURE — G0439 PPPS, SUBSEQ VISIT: HCPCS | Performed by: INTERNAL MEDICINE

## 2022-04-21 PROCEDURE — 96160 PT-FOCUSED HLTH RISK ASSMT: CPT | Performed by: INTERNAL MEDICINE

## 2022-04-21 PROCEDURE — 3074F SYST BP LT 130 MM HG: CPT | Performed by: INTERNAL MEDICINE

## 2022-04-21 RX ORDER — ATORVASTATIN CALCIUM 40 MG/1
40 TABLET, FILM COATED ORAL NIGHTLY
Qty: 90 TABLET | Refills: 3 | Status: SHIPPED | OUTPATIENT
Start: 2022-04-21

## 2022-04-21 RX ORDER — ATORVASTATIN CALCIUM 40 MG/1
40 TABLET, FILM COATED ORAL NIGHTLY
Qty: 90 TABLET | Refills: 3 | Status: SHIPPED | OUTPATIENT
Start: 2022-04-21 | End: 2022-04-21

## 2022-04-21 RX ORDER — VENLAFAXINE 37.5 MG/1
37.5 TABLET ORAL 2 TIMES DAILY
Qty: 30 TABLET | Refills: 11 | Status: SHIPPED | OUTPATIENT
Start: 2022-04-21

## 2022-04-21 RX ORDER — LOSARTAN POTASSIUM 100 MG/1
100 TABLET ORAL DAILY
Qty: 90 TABLET | Refills: 3 | Status: SHIPPED | OUTPATIENT
Start: 2022-04-21

## 2022-04-26 NOTE — PROGRESS NOTES
and full name confirmed     Blood collected successful patient tolerated draw well    Benton Valencia

## 2022-05-05 ENCOUNTER — HOSPITAL ENCOUNTER (OUTPATIENT)
Dept: MAMMOGRAPHY | Facility: HOSPITAL | Age: 72
Discharge: HOME | End: 2022-05-05
Attending: INTERNAL MEDICINE
Payer: MEDICARE

## 2022-05-05 DIAGNOSIS — Z85.3 HISTORY OF BREAST CANCER: ICD-10-CM

## 2022-05-05 DIAGNOSIS — Z12.31 VISIT FOR SCREENING MAMMOGRAM: ICD-10-CM

## 2022-05-05 PROCEDURE — 77067 SCR MAMMO BI INCL CAD: CPT | Performed by: INTERNAL MEDICINE

## 2022-05-05 PROCEDURE — 77063 BREAST TOMOSYNTHESIS BI: CPT | Performed by: INTERNAL MEDICINE

## 2022-05-17 ENCOUNTER — APPOINTMENT (OUTPATIENT)
Dept: HEMATOLOGY/ONCOLOGY | Facility: HOSPITAL | Age: 72
End: 2022-05-17
Attending: INTERNAL MEDICINE
Payer: MEDICARE

## 2022-05-24 ENCOUNTER — HOSPITAL ENCOUNTER (OUTPATIENT)
Dept: ULTRASOUND IMAGING | Facility: HOSPITAL | Age: 72
Discharge: HOME | End: 2022-05-24
Attending: INTERNAL MEDICINE
Payer: MEDICARE

## 2022-05-24 ENCOUNTER — HOSPITAL ENCOUNTER (OUTPATIENT)
Dept: MAMMOGRAPHY | Facility: HOSPITAL | Age: 72
Discharge: HOME | End: 2022-05-24
Attending: INTERNAL MEDICINE
Payer: MEDICARE

## 2022-05-24 DIAGNOSIS — R92.8 ABNORMAL MAMMOGRAM: ICD-10-CM

## 2022-05-24 PROCEDURE — 77065 DX MAMMO INCL CAD UNI: CPT | Performed by: INTERNAL MEDICINE

## 2022-05-24 PROCEDURE — 77061 BREAST TOMOSYNTHESIS UNI: CPT | Performed by: INTERNAL MEDICINE

## 2022-05-24 PROCEDURE — 76642 ULTRASOUND BREAST LIMITED: CPT | Performed by: INTERNAL MEDICINE

## 2022-05-27 ENCOUNTER — OFFICE VISIT (OUTPATIENT)
Dept: HEMATOLOGY/ONCOLOGY | Facility: HOSPITAL | Age: 72
End: 2022-05-27
Attending: INTERNAL MEDICINE
Payer: MEDICARE

## 2022-05-27 VITALS
RESPIRATION RATE: 16 BRPM | HEART RATE: 94 BPM | SYSTOLIC BLOOD PRESSURE: 154 MMHG | BODY MASS INDEX: 26.21 KG/M2 | WEIGHT: 130 LBS | OXYGEN SATURATION: 98 % | DIASTOLIC BLOOD PRESSURE: 75 MMHG | HEIGHT: 59 IN | TEMPERATURE: 98 F

## 2022-05-27 DIAGNOSIS — Z85.3 ENCOUNTER FOR FOLLOW-UP SURVEILLANCE OF BREAST CANCER: ICD-10-CM

## 2022-05-27 DIAGNOSIS — Z08 ENCOUNTER FOR FOLLOW-UP SURVEILLANCE OF BREAST CANCER: ICD-10-CM

## 2022-05-27 DIAGNOSIS — Z85.3 HISTORY OF RIGHT BREAST CANCER: Primary | ICD-10-CM

## 2022-05-27 DIAGNOSIS — Z90.11 S/P RIGHT MASTECTOMY: ICD-10-CM

## 2022-05-27 DIAGNOSIS — R92.8 ABNORMAL MAMMOGRAM OF LEFT BREAST: ICD-10-CM

## 2022-05-27 DIAGNOSIS — Z12.31 SCREENING MAMMOGRAM, ENCOUNTER FOR: ICD-10-CM

## 2022-05-27 PROCEDURE — 99211 OFF/OP EST MAY X REQ PHY/QHP: CPT

## 2022-06-18 ENCOUNTER — APPOINTMENT (OUTPATIENT)
Dept: GENERAL RADIOLOGY | Age: 72
End: 2022-06-18
Attending: NURSE PRACTITIONER
Payer: MEDICARE

## 2022-06-18 ENCOUNTER — HOSPITAL ENCOUNTER (OUTPATIENT)
Age: 72
Discharge: HOME | End: 2022-06-18
Payer: MEDICARE

## 2022-06-18 VITALS
RESPIRATION RATE: 18 BRPM | WEIGHT: 130 LBS | DIASTOLIC BLOOD PRESSURE: 70 MMHG | OXYGEN SATURATION: 97 % | HEIGHT: 59 IN | SYSTOLIC BLOOD PRESSURE: 120 MMHG | BODY MASS INDEX: 26.21 KG/M2 | TEMPERATURE: 98 F | HEART RATE: 82 BPM

## 2022-06-18 DIAGNOSIS — J01.90 ACUTE SINUSITIS, RECURRENCE NOT SPECIFIED, UNSPECIFIED LOCATION: ICD-10-CM

## 2022-06-18 DIAGNOSIS — R05.9 COUGH: ICD-10-CM

## 2022-06-18 DIAGNOSIS — Z20.822 ENCOUNTER FOR LABORATORY TESTING FOR COVID-19 VIRUS: Primary | ICD-10-CM

## 2022-06-18 LAB — SARS-COV-2 RNA RESP QL NAA+PROBE: NOT DETECTED

## 2022-06-18 PROCEDURE — 99203 OFFICE O/P NEW LOW 30 MIN: CPT | Performed by: NURSE PRACTITIONER

## 2022-06-18 PROCEDURE — U0002 COVID-19 LAB TEST NON-CDC: HCPCS | Performed by: NURSE PRACTITIONER

## 2022-06-18 PROCEDURE — 71046 X-RAY EXAM CHEST 2 VIEWS: CPT | Performed by: NURSE PRACTITIONER

## 2022-06-18 RX ORDER — AMOXICILLIN AND CLAVULANATE POTASSIUM 875; 125 MG/1; MG/1
1 TABLET, FILM COATED ORAL 2 TIMES DAILY
Qty: 20 TABLET | Refills: 0 | Status: SHIPPED | OUTPATIENT
Start: 2022-06-18 | End: 2022-06-28

## 2022-06-18 RX ORDER — PREDNISONE 20 MG/1
40 TABLET ORAL DAILY
Qty: 10 TABLET | Refills: 0 | Status: SHIPPED | OUTPATIENT
Start: 2022-06-18 | End: 2022-06-23

## 2022-06-18 NOTE — ED INITIAL ASSESSMENT (HPI)
Pt reports cough d/t post nasal drip and increased mucus production which started Monday. C/o nasal congestion, dry throat, and body aches (but reports increased in physical activity). Denies fevers. Positive viral illness contacts.

## 2022-07-12 RX ORDER — ATORVASTATIN CALCIUM 40 MG/1
40 TABLET, FILM COATED ORAL NIGHTLY
Qty: 90 TABLET | Refills: 3 | Status: SHIPPED | OUTPATIENT
Start: 2022-07-12

## 2022-07-12 NOTE — TELEPHONE ENCOUNTER
Patient isn't using [facility] any longer. Would like her refill request Atorvastatin 40 MG Oral Tab to be sent to [facility] in [facility].

## 2022-10-13 ENCOUNTER — TELEPHONE (OUTPATIENT)
Dept: INTERNAL MEDICINE CLINIC | Facility: CLINIC | Age: 72
End: 2022-10-13

## 2022-10-14 NOTE — TELEPHONE ENCOUNTER
Patient called back for medication adherence consult. Per insurance report, patient is coming due for refill on atorvastatin and losartan. Patient tells me she still has plenty of both medications left. She states she recently moved and things have been very hectic recently. Because of this she admits to forgetting or missing some doses lately. Did provide education and stressed the importance of taking medication just like prescribed to get the most benefit. Now that she is more settled she is getting back into a routine of taking her medications more regularly. She tells me she uses a pillbox to organize her medications. Recommended she keep it in a visible spot as a way to help remind her to take as well. Patient also mentions to me that she is no longer taking the venlafaxine. She tells me she was encouraged to start on a medication for her anxiety by her children. She took it for awhile, however she was very tired and did not like the way it made her feel so she stopped taking it. Patient tells me she uses meditation to help her when she is feeling stressed or anxious and this has been working well. Did encourage her to let PCP know that she is no longer taking this medication. Patient confirmed understanding and denies any questions or concerns with medications at this time.

## 2022-10-22 ENCOUNTER — APPOINTMENT (OUTPATIENT)
Dept: GENERAL RADIOLOGY | Age: 72
End: 2022-10-22
Attending: NURSE PRACTITIONER
Payer: MEDICARE

## 2022-10-22 ENCOUNTER — HOSPITAL ENCOUNTER (OUTPATIENT)
Age: 72
Discharge: HOME | End: 2022-10-22
Payer: MEDICARE

## 2022-10-22 VITALS
OXYGEN SATURATION: 99 % | DIASTOLIC BLOOD PRESSURE: 72 MMHG | TEMPERATURE: 98 F | RESPIRATION RATE: 18 BRPM | HEART RATE: 92 BPM | SYSTOLIC BLOOD PRESSURE: 149 MMHG

## 2022-10-22 DIAGNOSIS — M25.579 ANKLE PAIN: Primary | ICD-10-CM

## 2022-10-22 DIAGNOSIS — S93.401A SPRAIN OF RIGHT ANKLE, UNSPECIFIED LIGAMENT, INITIAL ENCOUNTER: ICD-10-CM

## 2022-10-22 PROCEDURE — 99213 OFFICE O/P EST LOW 20 MIN: CPT | Performed by: NURSE PRACTITIONER

## 2022-10-22 PROCEDURE — 73610 X-RAY EXAM OF ANKLE: CPT | Performed by: NURSE PRACTITIONER

## 2022-11-29 ENCOUNTER — TELEPHONE (OUTPATIENT)
Dept: INTERNAL MEDICINE CLINIC | Facility: CLINIC | Age: 72
End: 2022-11-29

## 2022-11-29 ENCOUNTER — APPOINTMENT (OUTPATIENT)
Dept: HEMATOLOGY/ONCOLOGY | Facility: HOSPITAL | Age: 72
End: 2022-11-29
Attending: INTERNAL MEDICINE
Payer: MEDICARE

## 2022-11-29 NOTE — TELEPHONE ENCOUNTER
Left message to call back with information on how she would like to receive mammogram order, mail or pickup?

## 2022-11-29 NOTE — TELEPHONE ENCOUNTER
Patient is asking if she can have an order for a mammogram and a sonogram.    She said she recently moved and got paperwork mixed up.       In order to see her specialist they told her she needs to get the mammogram done first.

## 2022-12-14 ENCOUNTER — HOSPITAL ENCOUNTER (OUTPATIENT)
Dept: MAMMOGRAPHY | Facility: HOSPITAL | Age: 72
Discharge: HOME | End: 2022-12-14
Attending: INTERNAL MEDICINE
Payer: MEDICARE

## 2022-12-14 ENCOUNTER — HOSPITAL ENCOUNTER (OUTPATIENT)
Dept: ULTRASOUND IMAGING | Facility: HOSPITAL | Age: 72
Discharge: HOME | End: 2022-12-14
Attending: INTERNAL MEDICINE
Payer: MEDICARE

## 2022-12-14 DIAGNOSIS — R92.8 ABNORMAL MAMMOGRAM OF LEFT BREAST: ICD-10-CM

## 2022-12-14 PROCEDURE — 76642 ULTRASOUND BREAST LIMITED: CPT | Performed by: INTERNAL MEDICINE

## 2022-12-14 PROCEDURE — 77061 BREAST TOMOSYNTHESIS UNI: CPT | Performed by: INTERNAL MEDICINE

## 2022-12-14 PROCEDURE — 77065 DX MAMMO INCL CAD UNI: CPT | Performed by: INTERNAL MEDICINE

## 2022-12-30 ENCOUNTER — OFFICE VISIT (OUTPATIENT)
Dept: HEMATOLOGY/ONCOLOGY | Facility: HOSPITAL | Age: 72
End: 2022-12-30
Attending: INTERNAL MEDICINE
Payer: MEDICARE

## 2022-12-30 VITALS
WEIGHT: 119.81 LBS | BODY MASS INDEX: 24.15 KG/M2 | OXYGEN SATURATION: 96 % | TEMPERATURE: 98 F | HEIGHT: 59 IN | DIASTOLIC BLOOD PRESSURE: 83 MMHG | RESPIRATION RATE: 18 BRPM | SYSTOLIC BLOOD PRESSURE: 146 MMHG | HEART RATE: 99 BPM

## 2022-12-30 DIAGNOSIS — Z12.31 SCREENING MAMMOGRAM, ENCOUNTER FOR: ICD-10-CM

## 2022-12-30 DIAGNOSIS — Z85.3 ENCOUNTER FOR FOLLOW-UP SURVEILLANCE OF BREAST CANCER: ICD-10-CM

## 2022-12-30 DIAGNOSIS — Z90.11 S/P RIGHT MASTECTOMY: ICD-10-CM

## 2022-12-30 DIAGNOSIS — M81.0 OSTEOPOROSIS, UNSPECIFIED OSTEOPOROSIS TYPE, UNSPECIFIED PATHOLOGICAL FRACTURE PRESENCE: ICD-10-CM

## 2022-12-30 DIAGNOSIS — Z08 ENCOUNTER FOR FOLLOW-UP SURVEILLANCE OF BREAST CANCER: ICD-10-CM

## 2022-12-30 DIAGNOSIS — Z85.3 HISTORY OF RIGHT BREAST CANCER: Primary | ICD-10-CM

## 2022-12-30 PROCEDURE — 99213 OFFICE O/P EST LOW 20 MIN: CPT | Performed by: INTERNAL MEDICINE

## 2023-02-13 ENCOUNTER — TELEPHONE (OUTPATIENT)
Dept: INTERNAL MEDICINE CLINIC | Facility: CLINIC | Age: 73
End: 2023-02-13

## 2023-02-13 NOTE — TELEPHONE ENCOUNTER
Will route note to Dr Nigel Limon re: possible SDA sooner than 3/13 for patient due to new breast lump.  ____________    Per Dr Nigel Limon, patient may be seen earlier in [facility] [id number] slot on 2/23/2023 @ 2pm.  [email or web address] message sent to patient advising her of this appt slot. If not convenient, patient to call the office to find other time slot.

## 2023-02-13 NOTE — TELEPHONE ENCOUNTER
Patient is calling in to schedule here physical. Next available is March 13. Patient states she has a lump on her breast that she is concerned with. Please call to advise if she needs a sooner appointment. Patient states she is a breast cancer survivor.

## 2023-02-23 ENCOUNTER — OFFICE VISIT (OUTPATIENT)
Dept: INTERNAL MEDICINE CLINIC | Facility: CLINIC | Age: 73
End: 2023-02-23
Payer: COMMERCIAL

## 2023-02-23 VITALS
BODY MASS INDEX: 23.79 KG/M2 | DIASTOLIC BLOOD PRESSURE: 70 MMHG | WEIGHT: 118 LBS | SYSTOLIC BLOOD PRESSURE: 110 MMHG | HEIGHT: 59 IN

## 2023-02-23 DIAGNOSIS — L82.0 KERATOSIS, INFLAMED SEBORRHEIC: Primary | ICD-10-CM

## 2023-02-23 DIAGNOSIS — I10 ESSENTIAL HYPERTENSION: ICD-10-CM

## 2023-02-23 DIAGNOSIS — E78.49 OTHER HYPERLIPIDEMIA: ICD-10-CM

## 2023-02-23 DIAGNOSIS — Z91.81 STATUS POST FALL: ICD-10-CM

## 2023-02-23 DIAGNOSIS — Z85.3 HISTORY OF BREAST CANCER: ICD-10-CM

## 2023-02-23 PROCEDURE — 3074F SYST BP LT 130 MM HG: CPT | Performed by: INTERNAL MEDICINE

## 2023-02-23 PROCEDURE — 3078F DIAST BP <80 MM HG: CPT | Performed by: INTERNAL MEDICINE

## 2023-02-23 PROCEDURE — 3008F BODY MASS INDEX DOCD: CPT | Performed by: INTERNAL MEDICINE

## 2023-02-23 PROCEDURE — 99214 OFFICE O/P EST MOD 30 MIN: CPT | Performed by: INTERNAL MEDICINE

## 2023-02-24 LAB
ABSOLUTE BASOPHILS: 46 CELLS/UL (ref 0–200)
ABSOLUTE EOSINOPHILS: 46 CELLS/UL (ref 15–500)
ABSOLUTE LYMPHOCYTES: 2420 CELLS/UL (ref 850–3900)
ABSOLUTE MONOCYTES: 331 CELLS/UL (ref 200–950)
ABSOLUTE NEUTROPHILS: 6357 CELLS/UL (ref 1500–7800)
ALBUMIN/GLOBULIN RATIO: 2.5 (CALC) (ref 1–2.5)
ALBUMIN: 4.7 G/DL (ref 3.6–5.1)
ALKALINE PHOSPHATASE: 78 U/L (ref 37–153)
ALT: 30 U/L (ref 6–29)
AST: 25 U/L (ref 10–35)
BASOPHILS: 0.5 %
BILIRUBIN, TOTAL: 0.6 MG/DL (ref 0.2–1.2)
BUN: 13 MG/DL (ref 7–25)
CALCIUM: 10 MG/DL (ref 8.6–10.4)
CARBON DIOXIDE: 27 MMOL/L (ref 20–32)
CHLORIDE: 104 MMOL/L (ref 98–110)
CHOL/HDLC RATIO: 3.5 (CALC)
CHOLESTEROL, TOTAL: 184 MG/DL
CREATININE: 0.67 MG/DL (ref 0.6–1)
EGFR: 93 ML/MIN/1.73M2
EOSINOPHILS: 0.5 %
GLOBULIN: 1.9 G/DL (CALC) (ref 1.9–3.7)
GLUCOSE: 87 MG/DL (ref 65–99)
HDL CHOLESTEROL: 53 MG/DL
HEMATOCRIT: 44.1 % (ref 35–45)
HEMOGLOBIN A1C: 5.1 % OF TOTAL HGB
HEMOGLOBIN: 15.3 G/DL (ref 11.7–15.5)
LDL-CHOLESTEROL: 106 MG/DL (CALC)
LYMPHOCYTES: 26.3 %
MCH: 31.4 PG (ref 27–33)
MCHC: 34.7 G/DL (ref 32–36)
MCV: 90.6 FL (ref 80–100)
MONOCYTES: 3.6 %
MPV: 10.6 FL (ref 7.5–12.5)
NEUTROPHILS: 69.1 %
NON-HDL CHOLESTEROL: 131 MG/DL (CALC)
PLATELET COUNT: 247 THOUSAND/UL (ref 140–400)
POTASSIUM: 4.4 MMOL/L (ref 3.5–5.3)
PROTEIN, TOTAL: 6.6 G/DL (ref 6.1–8.1)
RDW: 12.9 % (ref 11–15)
RED BLOOD CELL COUNT: 4.87 MILLION/UL (ref 3.8–5.1)
SODIUM: 140 MMOL/L (ref 135–146)
TRIGLYCERIDES: 137 MG/DL
WHITE BLOOD CELL COUNT: 9.2 THOUSAND/UL (ref 3.8–10.8)

## 2023-04-10 RX ORDER — LOSARTAN POTASSIUM 100 MG/1
TABLET ORAL
Qty: 90 TABLET | Refills: 0 | Status: SHIPPED | OUTPATIENT
Start: 2023-04-10

## 2023-08-02 RX ORDER — LOSARTAN POTASSIUM 100 MG/1
100 TABLET ORAL DAILY
Qty: 90 TABLET | Refills: 0 | Status: SHIPPED | OUTPATIENT
Start: 2023-08-02

## 2023-10-03 ENCOUNTER — OFFICE VISIT (OUTPATIENT)
Dept: INTERNAL MEDICINE CLINIC | Facility: CLINIC | Age: 73
End: 2023-10-03
Payer: COMMERCIAL

## 2023-10-03 VITALS
OXYGEN SATURATION: 99 % | DIASTOLIC BLOOD PRESSURE: 80 MMHG | SYSTOLIC BLOOD PRESSURE: 140 MMHG | BODY MASS INDEX: 23.22 KG/M2 | HEIGHT: 59 IN | HEART RATE: 97 BPM | WEIGHT: 115.19 LBS

## 2023-10-03 DIAGNOSIS — E78.2 MIXED HYPERLIPIDEMIA: ICD-10-CM

## 2023-10-03 DIAGNOSIS — Z17.0 MALIGNANT NEOPLASM OF UPPER-OUTER QUADRANT OF RIGHT BREAST IN FEMALE, ESTROGEN RECEPTOR POSITIVE: ICD-10-CM

## 2023-10-03 DIAGNOSIS — Z85.3 HISTORY OF BREAST CANCER: Primary | ICD-10-CM

## 2023-10-03 DIAGNOSIS — C50.411 MALIGNANT NEOPLASM OF UPPER-OUTER QUADRANT OF RIGHT BREAST IN FEMALE, ESTROGEN RECEPTOR POSITIVE: ICD-10-CM

## 2023-10-03 DIAGNOSIS — I10 PRIMARY HYPERTENSION: ICD-10-CM

## 2023-10-03 PROCEDURE — 1159F MED LIST DOCD IN RCRD: CPT | Performed by: INTERNAL MEDICINE

## 2023-10-03 PROCEDURE — 3008F BODY MASS INDEX DOCD: CPT | Performed by: INTERNAL MEDICINE

## 2023-10-03 PROCEDURE — 99214 OFFICE O/P EST MOD 30 MIN: CPT | Performed by: INTERNAL MEDICINE

## 2023-10-03 PROCEDURE — 3079F DIAST BP 80-89 MM HG: CPT | Performed by: INTERNAL MEDICINE

## 2023-10-03 PROCEDURE — 1160F RVW MEDS BY RX/DR IN RCRD: CPT | Performed by: INTERNAL MEDICINE

## 2023-10-03 PROCEDURE — 3077F SYST BP >= 140 MM HG: CPT | Performed by: INTERNAL MEDICINE

## 2023-10-03 RX ORDER — ATORVASTATIN CALCIUM 40 MG/1
40 TABLET, FILM COATED ORAL NIGHTLY
Qty: 90 TABLET | Refills: 3 | Status: SHIPPED | OUTPATIENT
Start: 2023-10-03

## 2023-10-03 RX ORDER — LOSARTAN POTASSIUM 100 MG/1
100 TABLET ORAL DAILY
Qty: 90 TABLET | Refills: 3 | Status: SHIPPED | OUTPATIENT
Start: 2023-10-03

## 2023-11-16 ENCOUNTER — TELEPHONE (OUTPATIENT)
Dept: INTERNAL MEDICINE CLINIC | Facility: CLINIC | Age: 73
End: 2023-11-16

## 2023-11-16 NOTE — TELEPHONE ENCOUNTER
Unable to reach patient for medication adherence consult. LVM for patient to call me back at [phone].

## 2023-11-17 NOTE — TELEPHONE ENCOUNTER
Reached patient for medication adherence consult. Patient overdue for refill on atorvastatin per insurance report. Patient also appears overdue for refill on losartan. Patient reports taking her medications as prescribed with only a rare missed dose. Patient describes using a pillbox and having a routine with her medications. She states that she accumulated extra medication through early refills. She endorses that she will need a refill in the next few days but states that she will contact her pharmacy. Provided education on importance of adherence. Patient denies any questions or concerns with medication.

## 2024-03-05 ENCOUNTER — OFFICE VISIT (OUTPATIENT)
Dept: HEMATOLOGY/ONCOLOGY | Facility: HOSPITAL | Age: 74
End: 2024-03-05
Attending: INTERNAL MEDICINE
Payer: MEDICARE

## 2024-03-05 VITALS
WEIGHT: 121 LBS | RESPIRATION RATE: 18 BRPM | DIASTOLIC BLOOD PRESSURE: 74 MMHG | SYSTOLIC BLOOD PRESSURE: 134 MMHG | BODY MASS INDEX: 24.39 KG/M2 | HEIGHT: 59 IN | HEART RATE: 103 BPM | OXYGEN SATURATION: 98 % | TEMPERATURE: 98 F

## 2024-03-05 DIAGNOSIS — Z85.3 HISTORY OF RIGHT BREAST CANCER: Primary | ICD-10-CM

## 2024-03-05 DIAGNOSIS — Z08 ENCOUNTER FOR FOLLOW-UP SURVEILLANCE OF BREAST CANCER: ICD-10-CM

## 2024-03-05 DIAGNOSIS — Z90.11 S/P RIGHT MASTECTOMY: ICD-10-CM

## 2024-03-05 DIAGNOSIS — Z12.31 SCREENING MAMMOGRAM, ENCOUNTER FOR: ICD-10-CM

## 2024-03-05 DIAGNOSIS — Z85.3 ENCOUNTER FOR FOLLOW-UP SURVEILLANCE OF BREAST CANCER: ICD-10-CM

## 2024-03-05 PROCEDURE — 99213 OFFICE O/P EST LOW 20 MIN: CPT | Performed by: INTERNAL MEDICINE

## 2024-03-05 NOTE — PROGRESS NOTES
HPI   Ines Quan is a 73 year old female here for f/u of   Encounter Diagnoses   Name Primary?    History of right breast cancer Yes    Encounter for follow-up surveillance of breast cancer     Screening mammogram, encounter for     S/P right mastectomy        Last OPV December 2022.  Last mammogram was also December 2022 with benign cyst in the 11 o'clock position of the left breast which is stable.  She was recommended for routine screening mammogram which was due this past December.  States feeling well.   States issues with confusion.      R chest wall w/o changes.  L breast is w/o changes.  Chest wall with pulling, states changes with weather and activities.  Has an AK lesion on the R breast.     She has quit smoking.    Weight stable.     ECOG PS: 1    Review of Systems:     Review of Systems   Constitutional:  Negative for appetite change, fatigue and unexpected weight change.   HENT:   Positive for hearing loss.    Eyes:  Positive for eye problems (wears glasses.).   Respiratory:  Negative for cough and shortness of breath.    Cardiovascular:  Negative for chest pain.   Gastrointestinal:  Negative for abdominal pain.   Musculoskeletal:  Positive for arthralgias (knees and ankle trauma recently), back pain (sciatica every so often, L>R.) and gait problem (thinks has balance problem).   Neurological:  Positive for extremity weakness (L side. ), gait problem (thinks has balance problem) and numbness (R index finger at times only, not lately). Negative for dizziness and headaches.   Hematological:  Negative for adenopathy.   Psychiatric/Behavioral:  Positive for depression and sleep disturbance (naps after eating at times.).            Current Outpatient Medications   Medication Sig Dispense Refill    losartan 100 MG Oral Tab Take 1 tablet (100 mg total) by mouth daily. 90 tablet 3    atorvastatin 40 MG Oral Tab Take 1 tablet (40 mg total) by mouth nightly. 90 tablet 3    ibuprofen 600 MG Oral Tab Take  1 tablet (600 mg total) by mouth as needed.      Cholecalciferol (VITAMIN D3) 5000 UNITS Oral Tab Take 1 tablet (5,000 Units total) by mouth daily.       Allergies:   No Known Allergies    Past Medical History:   Diagnosis Date    Breast cancer (HCC)     core bx right breast     CVA (cerebral infarction)     aphasia    Depression     Fx humerus shaft-closed     surgical repair    Hypercholesterolemia      Past Surgical History:   Procedure Laterality Date    HUMERUS FRACTURE SURGERY Right 12/2015    ORIF    MASTECTOMY RIGHT  2015    RADIATION RIGHT  2015     Social History     Socioeconomic History    Marital status:     Number of children: 2   Occupational History    Occupation: disabled      Comment: exposure to chemical fumes    Tobacco Use    Smoking status: Some Days     Packs/day: 1.00     Years: 20.00     Additional pack years: 0.00     Total pack years: 20.00     Types: Cigarettes    Smokeless tobacco: Never   Substance and Sexual Activity    Alcohol use: Yes     Alcohol/week: 1.0 standard drink of alcohol     Types: 1 Glasses of wine per week     Comment: rarely    Drug use: No   Other Topics Concern    Caffeine Concern Yes     Comment: About 8 cups of coffee per day...2 cans of pop per day.    Occupational Exposure Yes     Comment: toxic fumes        Family History   Problem Relation Age of Onset    Arthritis Mother     Cancer Father         pancreatic ca age 60s    Hypertension Father     Breast Cancer Self 64    Breast Cancer Paternal Cousin Female         age 40s         PHYSICAL EXAM:    /74 (BP Location: Left arm, Patient Position: Sitting, Cuff Size: adult)   Pulse 103   Temp 98.1 °F (36.7 °C) (Oral)   Resp 18   Ht 1.499 m (4' 11\")   Wt 54.9 kg (121 lb)   SpO2 98%   BMI 24.44 kg/m²   Wt Readings from Last 6 Encounters:   03/05/24 54.9 kg (121 lb)   10/03/23 52.3 kg (115 lb 3.2 oz)   02/23/23 53.5 kg (118 lb)   12/30/22 54.3 kg (119 lb 12.8 oz)   06/18/22 59 kg (130 lb)   05/27/22  59 kg (130 lb)     General: Patient is alert, not in acute distress.  HEENT: EOMs intact. PERRL.   Neck: No JVD. No palpable lymphadenopathy. Neck is supple.  Chest: Clear to auscultation.  R breast mastectomy, L breast no changes.  Heart: Regular rate and rhythm.   Abdomen: Soft, non tender with good bowel sounds.  Extremities: No edema.  Neurological: Grossly intact.   Lymphatics: There is no palpable lymphadenopathy throughout in the cervical, supraclavicular, axillary, or inguinal regions.  Psych/Depression: nl        ASSESSMENT/PLAN:     Encounter Diagnoses   Name Primary?    History of right breast cancer Yes    Encounter for follow-up surveillance of breast cancer     Screening mammogram, encounter for     S/P right mastectomy         Breast cancer, right breast (HCC)    Primary site: Breast (Right)    Staging method: AJCC 7th Edition    Clinical: Stage IIB (T3, N0, cM0) - Signed by Ulysses Lucero MD on 7/4/2014    Pathologic: Stage IIA (T1c, N1a, cM0) - Signed by Homer Lucero MD on 8/25/2015    Summary: Stage IIA (T1c, N1a, cM0)    Pathologic comments:      %      GA 54%      [id number] 1+      Ki 67 13% borderline            Oncotype Dx RS for node positive 1-3  RS 19, no benefit from addition of       chemotherapy.      S/p 1 yr of neoadjuvant femara with some down staging from stage IIB to stage IIA.  She did have positive LNs with extracapsular extension.  2/3 LN.    RT to chest wall and axilla completed.        Oncotype Dx RS of 19.  No additional benefit from chemo.    Completed 5 yrs total of adjuvant letrozole in August of 2019.      Osteoporosis:  DEXA scan May 2019 was worse.  Patient was evaluated by Dr. Manning on 09/20. She was scared about biphosphonates and declined.      Follow up in 1 year with labs.     Low risk.    No orders of the defined types were placed in this encounter.      Results From Past 48 Hours:  No results found for this or any previous visit (from the past 48  hour(s)).    None   No orders of the defined types were placed in this encounter.

## 2024-04-11 ENCOUNTER — OFFICE VISIT (OUTPATIENT)
Dept: INTERNAL MEDICINE CLINIC | Facility: CLINIC | Age: 74
End: 2024-04-11
Payer: COMMERCIAL

## 2024-04-11 VITALS
HEIGHT: 59 IN | WEIGHT: 128.63 LBS | DIASTOLIC BLOOD PRESSURE: 68 MMHG | SYSTOLIC BLOOD PRESSURE: 120 MMHG | HEART RATE: 78 BPM | OXYGEN SATURATION: 98 % | BODY MASS INDEX: 25.93 KG/M2

## 2024-04-11 DIAGNOSIS — Z00.00 MEDICARE ANNUAL WELLNESS VISIT, SUBSEQUENT: Primary | ICD-10-CM

## 2024-04-11 DIAGNOSIS — S93.402A SPRAIN OF LEFT ANKLE, UNSPECIFIED LIGAMENT, INITIAL ENCOUNTER: ICD-10-CM

## 2024-04-11 DIAGNOSIS — Z85.3 HISTORY OF BREAST CANCER: ICD-10-CM

## 2024-04-11 DIAGNOSIS — Z90.11 STATUS POST MASTECTOMY, RIGHT: ICD-10-CM

## 2024-04-11 DIAGNOSIS — I10 ESSENTIAL HYPERTENSION: ICD-10-CM

## 2024-04-11 RX ORDER — ATORVASTATIN CALCIUM 40 MG/1
40 TABLET, FILM COATED ORAL NIGHTLY
Qty: 90 TABLET | Refills: 3 | Status: SHIPPED | OUTPATIENT
Start: 2024-04-11

## 2024-04-11 RX ORDER — LOSARTAN POTASSIUM 100 MG/1
100 TABLET ORAL DAILY
Qty: 90 TABLET | Refills: 3 | Status: SHIPPED | OUTPATIENT
Start: 2024-04-11

## 2024-04-11 NOTE — PROGRESS NOTES
Subjective:   Ines Quan is a 73 year old female who presents for Well Adult (Medicare welness exam today )     Patient here for a MA supervisit and fu on htn and hyperlipidemia.  Stopped smoking in January.   Has left mammogram next month. Refuses colonoscopy - gave Fit test. Sprained left ankle over a month ago.  History/Other:    Chief Complaint Reviewed and Verified  Nursing Notes Reviewed and   Verified  Allergies Reviewed  Medications Reviewed  Problem List   Reviewed         Tobacco:  She currently  does notsmoke tobacco.  Social History     Tobacco Use   Smoking Status Some Days    Current packs/day: 1.00    Average packs/day: 1 pack/day for 20.0 years (20.0 ttl pk-yrs)    Types: Cigarettes   Smokeless Tobacco Never      Tobacco Cessation Documentation (Smoking and Smokeless included):  I had an in depth therapy session with Ines Quan about her tobacco use risks and options using the [employer]'s Five A's approach:    Ask: Ines is using tobacco products.  Assess: We asked about/assessed behavioral health risk and factors affecting choice of behavior change goals/methods.  Specifically I asked about readiness to quit tobacco.  Advise: We gave clear, specific, and personalized behavior change advice, including information about personal health harms and benefits. Specifically, she was told that Quitting tobacco is one of the best things she can do for her health. I strongly encouraged her to quit.      Agree: We collaboratively selected appropriate treatment goals and methods based on the patient’s interest in and willingness to change the behavior.   Assist: We used behavior change techniques (self-help and/or counseling), to aid Ines in achieving agreed-upon goals by acquiring the skills, confidence, and social/environmental supports for behavior change, supplemented with adjunctive medical treatments when appropriate. Additionally, national quitting tobacco aides were discussed.    Arrange: Follow up at next visit- see specific follow up below.    Time Counseled: <1 minutes       Current Outpatient Medications   Medication Sig Dispense Refill    atorvastatin 40 MG Oral Tab Take 1 tablet (40 mg total) by mouth nightly. 90 tablet 3    losartan 100 MG Oral Tab Take 1 tablet (100 mg total) by mouth daily. 90 tablet 3         Review of Systems:  Review of Systems      Objective:   /68   Pulse 78   Ht 4' 11\" (1.499 m)   Wt 128 lb 9.6 oz (58.3 kg)   SpO2 98%   BMI 25.97 kg/m²  Estimated body mass index is 25.97 kg/m² as calculated from the following:    Height as of this encounter: 4' 11\" (1.499 m).    Weight as of this encounter: 128 lb 9.6 oz (58.3 kg).  Physical Exam    HPI:   Ines Quan is a 73 year old female who presents for a MA Supervisit.    Patient Active Problem List   Diagnosis    Breast cancer, right breast (HCC)    Encounter for monitoring aromatase inhibitor therapy    S/P right mastectomy    Osteoporosis of lumbar spine    Screening mammogram, encounter for    Closed displaced fracture of surgical neck of right humerus    History of right breast cancer    Encounter for follow-up surveillance of breast cancer    Osteoporosis    Smokers' cough (HCC)       General Health          Has your appetite been poor?: No    Type of Diet: Balanced    How does the patient maintain a good energy level?: Other    How would you describe your daily physical activity?: Light    How would you describe your current health state?: Good    How do you maintain positive mental well-being?: Social Interaction         Have you had any immunizations at another office such as Influenza, Hepatitis B, Tetanus, or Pneumococcal?: No     Functional Ability     Bathing or Showering: Able without help    Toileting: Able without help    Dressing: Able without help    Eating: Able without help    Driving: Able without help    Preparing your meals: Able without help    Managing money/bills: Able without  help    Taking medications as prescribed: Able without help    Are you able to afford your medications?: Yes    Hearing Problems?: No     Functional Status     Hearing Problems?: No    Vision Problems? : No    Difficulty walking?: No    Difficulty dressing or bathing?: No    Problems with daily activities? : No    Memory Problems?: Yes      Fall/Risk Assessment                                                              Depression Screening (PHQ-2/PHQ-9): Over the LAST 2 WEEKS                      Advance Directives     Do you have a healthcare power of ?: No    Do you have a living will?: No   Was Medicare Assessment Questionnaire completed by patient and sent to HIM for scanning?Yes    Please go to \"Cognitive Assessment\" under Medicare Assessment section in Charting, test patient and document.    Then, refresh your progress note to see your input here.  Cognitive Assessment     What day of the week is this?: Correct    What month is it?: Correct    What year is it?: Correct    Recall \"Ball\": Correct    Recall \"Flag\": Correct    Recall \"Tree\": Correct      ALLERGIES:   No Known Allergies    CURRENT MEDICATIONS:   Current Outpatient Medications   Medication Sig Dispense Refill    atorvastatin 40 MG Oral Tab Take 1 tablet (40 mg total) by mouth nightly. 90 tablet 3    losartan 100 MG Oral Tab Take 1 tablet (100 mg total) by mouth daily. 90 tablet 3      MEDICAL INFORMATION:   Past Medical History:    Breast cancer (HCC)    core bx right breast     CVA (cerebral infarction)    aphasia    Depression    Fx humerus shaft-closed    surgical repair    Hypercholesterolemia      Past Surgical History:   Procedure Laterality Date    Humerus fracture surgery Right 12/2015    ORIF    Mastectomy right  2015    Radiation right  2015      Family History   Problem Relation Age of Onset    Arthritis Mother     Cancer Father         pancreatic ca age 60s    Hypertension Father     Breast Cancer Self 64    Breast Cancer  Paternal Cousin Female         age 40s      SOCIAL HISTORY:   Social History     Socioeconomic History    Marital status:     Number of children: 2   Occupational History    Occupation: disabled      Comment: exposure to chemical fumes    Tobacco Use    Smoking status: Some Days     Current packs/day: 1.00     Average packs/day: 1 pack/day for 20.0 years (20.0 ttl pk-yrs)     Types: Cigarettes    Smokeless tobacco: Never   Substance and Sexual Activity    Alcohol use: Yes     Alcohol/week: 1.0 standard drink of alcohol     Types: 1 Glasses of wine per week     Comment: rarely    Drug use: No   Other Topics Concern    Caffeine Concern Yes     Comment: About 8 cups of coffee per day...2 cans of pop per day.    Occupational Exposure Yes     Comment: toxic fumes      Occ: retired : no      REVIEW OF SYSTEMS:   GENERAL: feels well otherwise  SKIN: denies any unusual skin lesions  EYES: denies blurred vision or double vision bifocals  HEENT: denies nasal congestion, sinus pain or ST  LUNGS: denies shortness of breath with exertion  CARDIOVASCULAR: denies chest pain on exertion  GI: denies abdominal pain, occasional heartburn  : denies dysuria, vaginal discharge or itching, no complaint of urinary incontinence   MUSCULOSKELETAL: denies back pain  NEURO: denies headaches  PSYCHE: denies depression or anxiety  HEMATOLOGIC: denies hx of anemia  ENDOCRINE: denies thyroid history  ALL/ASTHMA: denies hx of allergy or asthma    EXAM:   /68   Pulse 78   Ht 4' 11\" (1.499 m)   Wt 128 lb 9.6 oz (58.3 kg)   SpO2 98%   BMI 25.97 kg/m²      >   Wt Readings from Last 6 Encounters:   04/11/24 128 lb 9.6 oz (58.3 kg)   03/05/24 121 lb (54.9 kg)   10/03/23 115 lb 3.2 oz (52.3 kg)   02/23/23 118 lb (53.5 kg)   12/30/22 119 lb 12.8 oz (54.3 kg)   06/18/22 130 lb (59 kg)       >   BP Readings from Last 3 Encounters:   04/11/24 120/68   03/05/24 134/74   10/03/23 140/80       GENERAL: well developed, well nourished,  in no apparent distress  SKIN: no rashes, no suspicious lesions  HEENT: atraumatic, normocephalic, ears and throat are clear   Hearing intact with bilateral hearing aids  EYES: PERRLA, EOMI, conjunctiva are clear  Right Eye Visual Acuity: Uncorrected Left Eye Visual Acuity: Uncorrected   Right Eye Chart Acuity: 20/20 Left Eye Chart Acuity: 20/20   NECK: supple, no adenopathy, no bruits  CHEST: no chest tenderness  BREAST: no masses, no discharge or no axillary lymphadenopathy right mastectomy left breast no misael  LUNGS: clear to auscultation  CARDIO: RRR without murmur  GI: good BS's, no masses, HSM or tenderness  : deferred  RECTAL: deferred  MUSCULOSKELETAL: back is not tender, FROM of the back  EXTREMIES- soft tissue swelling over left ankle  NEURO: Oriented times three, cranial nerves are intact, motor and sensory are grossly intact    ASSESSMENT AND OTHER RELEVANT CHRONIC CONDITIONS:   Ines Quan is a 73 year old female who presents for a Medicare Assessment.     PLAN SUMMARY:   Diagnoses and all orders for this visit:    Medicare annual wellness visit, subsequent  -     COMP METABOLIC PANEL [79487] [Q]  -     LIPID PANEL [7600] [Q]  -     CBC [6399] [Q]  -     TSH W REFLEX TO FREE T4 [40414][Q]    Essential hypertension    Status post mastectomy, right    History of breast cancer    Sprain of left ankle, unspecified ligament, initial encounter    Other orders  -     atorvastatin 40 MG Oral Tab; Take 1 tablet (40 mg total) by mouth nightly.  -     losartan 100 MG Oral Tab; Take 1 tablet (100 mg total) by mouth daily.         The patient indicates understanding of these issues and agrees to the plan.  The patient is asked to return in 12m for fu.       PREVENTATIVE SERVICES  INDICATIONS AND SCHEDULE Internal Lab or Procedure External Lab or Procedure   Diabetes Screening      HbgA1C   Annually HEMOGLOBIN A1c (% of total Hgb)   Date Value   02/23/2023 5.1         No data to display                 Fasting Blood Sugar (FSB)Annually No results found for: \"GLUCOSE\"    Cardiovascular Disease Screening     LDL Annually LDL-CHOLESTEROL (mg/dL (calc))   Date Value   02/23/2023 106 (H)        EKG One Time     Colorectal Cancer Screening      Colonoscopy Screen every 10 years Health Maintenance   Topic Date Due    Colorectal Cancer Screening  Never done    Update Health Maintenance if applicable    Flex Sigmoidoscopy Screen every 10 years No results found for this or any previous visit.      No data to display                 Fecal Occult Blood Annually No results found for: \"FOB\"      No data to display                Glaucoma Screening      Ophthalmology Visit Annually     Bone Density Screening      Dexascan Every two years Last Dexa Scan:    XR DEXA BONE DENSITOMETRY (CPT=77080) 05/08/2019        No data to display                Pap and Pelvic      Pap  Annually if high risk No recommendations at this time Update Health Maintenance if applicable    Pap  Every two years No recommendations at this time Update Health Maintenance if applicable    Chlamydia  Annually if high risk No results found for: \"CHLAMYDIA\"      No data to display                Screening Mammogram      Mammogram  Annually Health Maintenance   Topic Date Due    Mammogram  12/14/2023    Update Health Maintenance if applicable   Immunizations      Influenza No orders found for this or any previous visit. Update Immunization Activity if applicable    Pneumococcal Orders placed or performed in visit on 04/02/19    Pneumococcal (Prevnar 13) (78295) (DX V03.82/Z23)    Update Immunization Activity if applicable    Hepatitis B No orders found for this or any previous visit. Update Immunization Activity if applicable    Tetanus No orders found for this or any previous visit. Update Immunization Activity if applicable        SPECIFIC DISEASE MONITORING Internal Lab or Procedure External Lab or Procedure   Annual Monitoring of Persistent     Medications  (ACE/ARB, digoxin diuretics, anticonvulsants.)    Potassium  Annually POTASSIUM (mmol/L)   Date Value   02/23/2023 4.4         No data to display                Creatinine  Annually CREATININE (mg/dL)   Date Value   02/23/2023 0.67         No data to display                BUN  Annually UREA NITROGEN (BUN) (mg/dL)   Date Value   02/23/2023 13         No data to display                 Drug Serum Conc  Annually No results found for: \"DIGOXIN\", \"DIG\", \"VALP\"      No data to display                Diabetes      HgbA1C  Annually HEMOGLOBIN A1c (% of total Hgb)   Date Value   02/23/2023 5.1         No data to display                Creat/alb ratio  Annually      LDL  Annually LDL-CHOLESTEROL (mg/dL (calc))   Date Value   02/23/2023 106 (H)         No data to display                 Dilated Eye exam  Annually      No data to display                   No data to display                COPD      Spirometry Testing Annually No results found for this or any previous visit.      No data to display                  SCREENING SCHEDULE - FEMALE      SCREEN COVERAGE SCHEDULE  (If Indicated) LAST DONE   Dexa If at Risk q2 years    Lipids All Patients q5 years LDL-CHOLESTEROL (mg/dL (calc))   Date Value   02/23/2023 106 (H)      Colonoscopy All Patients q10 years Health Maintenance   Topic Date Due    Colorectal Cancer Screening  Never done      FBS All Patients q1 year No results found for: \"GLUCOSE\"   Glaucoma If at high risk q1 year    Pap If at high risk q1 year No recommendations at this time   Pap All Patients q2 year if indicated No recommendations at this time   Mammogram Age > 39 q1 year Health Maintenance   Topic Date Due    Mammogram  12/14/2023      EKG All Patients One at initial exam    Vaccines:      Pneumococcal All Patients Once per lifetime Orders placed or performed in visit on 04/02/19    Pneumococcal (Prevnar 13) (45577) (DX V03.82/Z23)      Influenza All Patients Annually No orders found for this or any  previous visit.   Hepatitis B If at Risk 3 scheduled doses No orders found for this or any previous visit.         SUGGESTED VACCINATIONS - Influenza, Pneumococcal, Zoster, Tetanus     Immunization History   Administered Date(s) Administered    HIGH DOSE FLU 65 YRS AND OLDER PRSV FREE SINGLE D (88868) FLU CLINIC 11/02/2016   Deferred Date(s) Deferred    Pneumococcal (Prevnar 13) 04/02/2019           Assessment & Plan:   1. Medicare annual wellness visit, subsequent (Primary) check fasting labs  2. Essential hypertension on Losartan 100mg  3. Status post mastectomy, right -left unilateral mammogram  4. History of breast cancer -in remmision  Other orders  -     Atorvastatin Calcium; Take 1 tablet (40 mg total) by mouth nightly.  Dispense: 90 tablet; Refill: 3  -     Losartan Potassium; Take 1 tablet (100 mg total) by mouth daily.  Dispense: 90 tablet; Refill: 3  5. Left ankle sprain - strengthening excercises      No follow-ups on file.    Rita Padilla MD, 4/11/2024, 1:08 PM

## 2024-04-12 LAB
ABSOLUTE BASOPHILS: 50 CELLS/UL (ref 0–200)
ABSOLUTE EOSINOPHILS: 76 CELLS/UL (ref 15–500)
ABSOLUTE LYMPHOCYTES: 2696 CELLS/UL (ref 850–3900)
ABSOLUTE MONOCYTES: 412 CELLS/UL (ref 200–950)
ABSOLUTE NEUTROPHILS: 5166 CELLS/UL (ref 1500–7800)
ALBUMIN/GLOBULIN RATIO: 2.4 (CALC) (ref 1–2.5)
ALBUMIN: 4.8 G/DL (ref 3.6–5.1)
ALKALINE PHOSPHATASE: 80 U/L (ref 37–153)
ALT: 34 U/L (ref 6–29)
AST: 25 U/L (ref 10–35)
BASOPHILS: 0.6 %
BILIRUBIN, TOTAL: 0.9 MG/DL (ref 0.2–1.2)
BUN: 13 MG/DL (ref 7–25)
CALCIUM: 10 MG/DL (ref 8.6–10.4)
CARBON DIOXIDE: 28 MMOL/L (ref 20–32)
CHLORIDE: 103 MMOL/L (ref 98–110)
CHOL/HDLC RATIO: 2.9 (CALC)
CHOLESTEROL, TOTAL: 217 MG/DL
CREATININE: 0.72 MG/DL (ref 0.6–1)
EGFR: 88 ML/MIN/1.73M2
EOSINOPHILS: 0.9 %
GLOBULIN: 2 G/DL (CALC) (ref 1.9–3.7)
GLUCOSE: 94 MG/DL (ref 65–99)
HDL CHOLESTEROL: 74 MG/DL
HEMATOCRIT: 45.2 % (ref 35–45)
HEMOGLOBIN: 15 G/DL (ref 11.7–15.5)
LDL-CHOLESTEROL: 120 MG/DL (CALC)
LYMPHOCYTES: 32.1 %
MCH: 30.2 PG (ref 27–33)
MCHC: 33.2 G/DL (ref 32–36)
MCV: 91.1 FL (ref 80–100)
MONOCYTES: 4.9 %
MPV: 9.9 FL (ref 7.5–12.5)
NEUTROPHILS: 61.5 %
NON-HDL CHOLESTEROL: 143 MG/DL (CALC)
PLATELET COUNT: 236 THOUSAND/UL (ref 140–400)
POTASSIUM: 4.6 MMOL/L (ref 3.5–5.3)
PROTEIN, TOTAL: 6.8 G/DL (ref 6.1–8.1)
RDW: 12.7 % (ref 11–15)
RED BLOOD CELL COUNT: 4.96 MILLION/UL (ref 3.8–5.1)
SODIUM: 140 MMOL/L (ref 135–146)
TRIGLYCERIDES: 121 MG/DL
TSH W/REFLEX TO FT4: 2.2 MIU/L (ref 0.4–4.5)
WHITE BLOOD CELL COUNT: 8.4 THOUSAND/UL (ref 3.8–10.8)

## 2024-04-25 ENCOUNTER — HOSPITAL ENCOUNTER (OUTPATIENT)
Dept: MAMMOGRAPHY | Facility: HOSPITAL | Age: 74
Discharge: HOME | End: 2024-04-25
Attending: INTERNAL MEDICINE
Payer: MEDICARE

## 2024-04-25 DIAGNOSIS — Z12.31 SCREENING MAMMOGRAM, ENCOUNTER FOR: ICD-10-CM

## 2024-04-25 DIAGNOSIS — Z90.11 S/P RIGHT MASTECTOMY: ICD-10-CM

## 2024-04-25 PROCEDURE — 77063 BREAST TOMOSYNTHESIS BI: CPT | Performed by: INTERNAL MEDICINE

## 2024-04-25 PROCEDURE — 77067 SCR MAMMO BI INCL CAD: CPT | Performed by: INTERNAL MEDICINE

## 2024-08-21 ENCOUNTER — OFFICE VISIT (OUTPATIENT)
Dept: FAMILY MEDICINE CLINIC | Facility: CLINIC | Age: 74
End: 2024-08-21
Payer: COMMERCIAL

## 2024-08-21 VITALS
HEART RATE: 96 BPM | OXYGEN SATURATION: 98 % | RESPIRATION RATE: 16 BRPM | DIASTOLIC BLOOD PRESSURE: 82 MMHG | TEMPERATURE: 97 F | SYSTOLIC BLOOD PRESSURE: 140 MMHG

## 2024-08-21 DIAGNOSIS — H53.9 VISUAL CHANGES: ICD-10-CM

## 2024-08-21 DIAGNOSIS — L30.9 DERMATITIS: Primary | ICD-10-CM

## 2024-08-21 PROCEDURE — 3077F SYST BP >= 140 MM HG: CPT | Performed by: NURSE PRACTITIONER

## 2024-08-21 PROCEDURE — 1159F MED LIST DOCD IN RCRD: CPT | Performed by: NURSE PRACTITIONER

## 2024-08-21 PROCEDURE — 99214 OFFICE O/P EST MOD 30 MIN: CPT | Performed by: NURSE PRACTITIONER

## 2024-08-21 PROCEDURE — 1160F RVW MEDS BY RX/DR IN RCRD: CPT | Performed by: NURSE PRACTITIONER

## 2024-08-21 PROCEDURE — 3079F DIAST BP 80-89 MM HG: CPT | Performed by: NURSE PRACTITIONER

## 2024-08-21 RX ORDER — TRIAMCINOLONE ACETONIDE 1 MG/G
CREAM TOPICAL
Qty: 30 G | Refills: 0 | Status: SHIPPED | OUTPATIENT
Start: 2024-08-21

## 2024-08-21 NOTE — PROGRESS NOTES
CHIEF COMPLAINT:     Chief Complaint   Patient presents with    Rash       HPI:   Ines Quan is a 74 year old female who presents with complaints of rash to back and also some off and on vision changes.  The rash has been present x 1week.  The visual changes about 2 years now.  The rash is in lumbar area and is pruritic, non-tender.  No known causes but did change to gain laundry detergent.  She does swim frequently in her apartment's swimming pool.  She applied cortisone with some relief.      Also reports intermittent vision changes for at least 2 years.  Reports she was told at least 8-10 years ago that she had the start of cataracts, then again 2 years ago was told she had cataracts.  She is noticing some difficulties with focus.  Sometimes the visual field will look \"unbalanced\"- like one eye seeing higher than the other.  Also trouble with peripheral vision and driving at night, bright light seems to definitely worsen the symptoms.  Has been told at one point she had ocular migraines.  Does wear glasses.  Denies dizziness, HA, chest pain, weakness, speech change from baseline, flashing lights, disorientation from baseline.    Current Outpatient Medications   Medication Sig Dispense Refill    triamcinolone 0.1 % External Cream Apply to affected area of back BID for 7 days 30 g 0    atorvastatin 40 MG Oral Tab Take 1 tablet (40 mg total) by mouth nightly. 90 tablet 3    losartan 100 MG Oral Tab Take 1 tablet (100 mg total) by mouth daily. 90 tablet 3      Past Medical History:    Breast cancer (HCC)    core bx right breast     CVA (cerebral infarction)    aphasia    Depression    Fx humerus shaft-closed    surgical repair    Hypercholesterolemia      Social History:  Social History     Socioeconomic History    Marital status:     Number of children: 2   Occupational History    Occupation: disabled      Comment: exposure to chemical fumes    Tobacco Use    Smoking status: Some Days     Current  packs/day: 1.00     Average packs/day: 1 pack/day for 20.0 years (20.0 ttl pk-yrs)     Types: Cigarettes    Smokeless tobacco: Never   Substance and Sexual Activity    Alcohol use: Yes     Alcohol/week: 1.0 standard drink of alcohol     Types: 1 Glasses of wine per week     Comment: rarely    Drug use: No   Other Topics Concern    Caffeine Concern Yes     Comment: About 8 cups of coffee per day...2 cans of pop per day.    Occupational Exposure Yes     Comment: toxic fumes         REVIEW OF SYSTEMS:   GENERAL: Denies fever, chills,weight change, decreased appetite  SKIN: See HPI.  EYES: See HPI.  Denies blurred vision or double vision  HENT: Denies congestion, rhinorrhea, sore throat or ear pain  CHEST: Denies chest pain, or palpitations  LUNGS: Denies shortness of breath, cough, or wheezing  GI: Denies abdominal pain, N/V/C/D.   MUSCULOSKELETAL: no arthralgia or swollen joints  LYMPH:  Denies lymphadenopathy  NEURO: Denies headaches or lightheadedness      EXAM:   /82   Pulse 96   Temp 97.2 °F (36.2 °C) (Tympanic)   Resp 16   SpO2 98%   GENERAL: Well-appearing, well developed, well nourished,in no apparent distress  SKIN: +Tan to reddish macules located to lumbar and low thoracic area.  +Pruritic, non-tender.  No rashes elsewhere.  HEAD: atraumatic, normocephalic  EYES: conjunctiva clear, EOM intact, PERRL  LUNGS: clear to auscultation bilaterally, no wheezes or rhonchi. Breathing is non labored.  CARDIO: RRR without murmur  EXTREMITIES: no cyanosis, clubbing or edema  NEURO: Cranial nerves grossly intact.  A&O x4.  Gait- normal.  Moving all extremities without difficulty.  +Occasional stuttering/aphasia consistent with her baseline per pt.    ASSESSMENT AND PLAN:     ASSESSMENT:  Encounter Diagnoses   Name Primary?    Dermatitis Yes    Visual changes        PLAN:  - Intermittent visual changes for the past 2 years.  Was told she has cataracts.  Also wears glasses and feels dry eye.  Last optometry appt  with [facility] 2 years ago.  Advised seeing ophthalmology, gave Dr. Vang and Dr. Reeves's information to schedule asap.  - Advised to monitor symptoms closely and if ever acute vision change, HA, speech change, weakness, pt to strictly proceed to ER.  No neurological deficits at this time.  - Localized dermatitis on back with unclear cause.  Continue to watch for triggers.  Did recently change laundry detergent.    - Trial of triamcinolone this week and follow up with dermatology if not improving.  Contact information provided.  - Consider switching back to old laundry detergent.  Avoid heat/hot water/scratching.  - The patient indicates understanding of these issues and agrees to the plan.

## 2024-09-09 ENCOUNTER — OFFICE VISIT (OUTPATIENT)
Dept: INTERNAL MEDICINE CLINIC | Facility: CLINIC | Age: 74
End: 2024-09-09
Payer: COMMERCIAL

## 2024-09-09 VITALS
BODY MASS INDEX: 27.62 KG/M2 | SYSTOLIC BLOOD PRESSURE: 140 MMHG | OXYGEN SATURATION: 98 % | HEIGHT: 59 IN | DIASTOLIC BLOOD PRESSURE: 90 MMHG | WEIGHT: 137 LBS | HEART RATE: 90 BPM | TEMPERATURE: 97 F

## 2024-09-09 DIAGNOSIS — L71.9 ROSACEA: ICD-10-CM

## 2024-09-09 DIAGNOSIS — L42 PITYRIASIS ROSEA: Primary | ICD-10-CM

## 2024-09-09 PROCEDURE — 3080F DIAST BP >= 90 MM HG: CPT

## 2024-09-09 PROCEDURE — 99213 OFFICE O/P EST LOW 20 MIN: CPT

## 2024-09-09 PROCEDURE — 1160F RVW MEDS BY RX/DR IN RCRD: CPT

## 2024-09-09 PROCEDURE — 1159F MED LIST DOCD IN RCRD: CPT

## 2024-09-09 PROCEDURE — 3008F BODY MASS INDEX DOCD: CPT

## 2024-09-09 PROCEDURE — 3077F SYST BP >= 140 MM HG: CPT

## 2024-09-10 NOTE — PROGRESS NOTES
Ines Quan is a 74 year old femalewho presents with   Chief Complaint   Patient presents with    Follow - Up     WIC for dermatitis or rash under left breast, sternum, and lower back.      HPI:   Reports 3-week history of mildly itchy rash located under left, lower sternum, and central mid to low back.  No exposure to new skin/aundry products or chemicals.  Had been swimming in a public pool prior to then.  OTC remedies tried hydrocortisone and Rx triamcinolone    Current outpatient prescriptions on file: Losartan 100 mg, and atorvastatin 40 mg           REVIEW OF SYSTEMS:   GENERAL HEALTH: feels well otherwise; mild fever 2-3 weeks ago- recent viral infection/illness and felt achy  SKIN: Mild pruritis, no tenderness, no blisters, bleeding scabs, or purulent drainage;    RESPIRATORY: denies shortness of breath with exertion  CARDIOVASCULAR: denies chest pain on exertion  GI: No nausea, appetite back to normal    EXAM:   /90   Pulse 90   Temp 97.4 °F (36.3 °C)   Ht 4' 11\" (1.499 m)   Wt 137 lb (62.1 kg)   SpO2 98%   BMI 27.67 kg/m²   GENERAL: well developed, well nourished,in no apparent distress  SKIN: Rash/lesion(s): Reddish-purple broad papular lesions;  located scattered in central mid to low back, under left breast ans on lower sternum- see photos;  no scabs, no drainage;  no s/s secondary infection  Face: Prominent capillaries of rosacea  LUNGS: clear to auscultation  CARDIO: RRR without murmur  EXTREMITIES: no cyanosis,  or edema. Has pinkened thenar eminences bilateral.      ASSESSMENT AND PLAN:   Ines Quan is a 74 year old female who presents with   Chief Complaint   Patient presents with    Follow - Up     WIC for dermatitis or rash under left breast, sternum, and lower back.        ASSESSMENT:  Encounter Diagnoses   Name Primary?    Pityriasis rosea Yes    Rosacea            PLAN:  Requested Prescriptions      No prescriptions requested or ordered in this encounter   OTC  hydrocortisone 1% as needed itching, but not more than 1 week    See attachment  There are no Patient Instructions on file for this visit.    Avoid hot showers, as this can aggravate the rash. Cool/tepid showers with minimal mild soap. Gently pat area dry.  Cold compresses may help relieve the itching  Consider derm referral  Rash may last a few months, 8-12 weeks    The patient indicates understanding of these issues and agrees to the plan.

## 2024-10-03 ENCOUNTER — OFFICE VISIT (OUTPATIENT)
Dept: INTERNAL MEDICINE CLINIC | Facility: CLINIC | Age: 74
End: 2024-10-03
Payer: COMMERCIAL

## 2024-10-03 VITALS
OXYGEN SATURATION: 99 % | SYSTOLIC BLOOD PRESSURE: 110 MMHG | HEIGHT: 59 IN | WEIGHT: 139 LBS | DIASTOLIC BLOOD PRESSURE: 60 MMHG | BODY MASS INDEX: 28.02 KG/M2 | HEART RATE: 94 BPM

## 2024-10-03 DIAGNOSIS — L30.9 DERMATITIS: Primary | ICD-10-CM

## 2024-10-03 PROCEDURE — 99213 OFFICE O/P EST LOW 20 MIN: CPT | Performed by: INTERNAL MEDICINE

## 2024-10-03 PROCEDURE — 1159F MED LIST DOCD IN RCRD: CPT | Performed by: INTERNAL MEDICINE

## 2024-10-03 PROCEDURE — 3074F SYST BP LT 130 MM HG: CPT | Performed by: INTERNAL MEDICINE

## 2024-10-03 PROCEDURE — 1160F RVW MEDS BY RX/DR IN RCRD: CPT | Performed by: INTERNAL MEDICINE

## 2024-10-03 PROCEDURE — 3008F BODY MASS INDEX DOCD: CPT | Performed by: INTERNAL MEDICINE

## 2024-10-03 PROCEDURE — 3078F DIAST BP <80 MM HG: CPT | Performed by: INTERNAL MEDICINE

## 2024-10-03 NOTE — PROGRESS NOTES
Subjective:   Ines Quan is a 74 year old female who presents for Rash     Patient here for evaluation of an itchy rash on the left chest wall back and left wrist.  Has not resp[onded to triamcinolone rx.  Has been using an outdoor pool for much of the summer.  Also has had some exposure to mites.  The rash is moderately itchy  History/Other:    Chief Complaint Reviewed and Verified  No Further Nursing Notes to   Review  Medications Reviewed         Tobacco  non smoker    Please update the information in the Tobacco history section to reflect the true status, and refresh this smartlink.  Social History     Tobacco Use   Smoking Status Some Days    Current packs/day: 1.00    Average packs/day: 1 pack/day for 20.0 years (20.0 ttl pk-yrs)    Types: Cigarettes   Smokeless Tobacco Never        Tobacco cessation counseling for <3 minutes.      Current Outpatient Medications   Medication Sig Dispense Refill    triamcinolone 0.1 % External Cream Apply to affected area of back BID for 7 days 30 g 0    atorvastatin 40 MG Oral Tab Take 1 tablet (40 mg total) by mouth nightly. 90 tablet 3    losartan 100 MG Oral Tab Take 1 tablet (100 mg total) by mouth daily. 90 tablet 3         Review of Systems:  Review of Systems   Cardiovascular:  Positive for leg swelling.   Musculoskeletal:  Positive for arthralgias (left ankle).   Skin:  Positive for rash.   Neurological:  Negative for numbness.         Objective:   /60   Pulse 94   Ht 4' 11\" (1.499 m)   Wt 139 lb (63 kg)   SpO2 99%   BMI 28.07 kg/m²  Estimated body mass index is 28.07 kg/m² as calculated from the following:    Height as of this encounter: 4' 11\" (1.499 m).    Weight as of this encounter: 139 lb (63 kg).  Physical Exam  Constitutional:       Appearance: Normal appearance. She is not ill-appearing.   HENT:      Head: Normocephalic and atraumatic.   Eyes:      General: No scleral icterus.     Pupils: Pupils are equal, round, and reactive to light.    Cardiovascular:      Rate and Rhythm: Normal rate and regular rhythm.   Pulmonary:      Effort: Pulmonary effort is normal.      Breath sounds: Normal breath sounds.   Abdominal:      Tenderness: There is no abdominal tenderness.   Musculoskeletal:      Cervical back: Neck supple.      Left lower leg: Edema (left ankle trace) present.   Skin:     Findings: Rash (macular purple colored rash on trunk and back) present.   Neurological:      Mental Status: She is alert. Mental status is at baseline.   Psychiatric:         Thought Content: Thought content normal.           Assessment & Plan:   1. Dermatitis (Primary) unknown etiology - referral to dermatology  -     Derm Referral - In Network        No follow-ups on file.    Rita Padilla MD, 10/3/2024, 11:09 AM

## 2024-10-23 ENCOUNTER — OFFICE VISIT (OUTPATIENT)
Dept: DERMATOLOGY CLINIC | Facility: CLINIC | Age: 74
End: 2024-10-23
Payer: COMMERCIAL

## 2024-10-23 DIAGNOSIS — R21 RASH AND NONSPECIFIC SKIN ERUPTION: Primary | ICD-10-CM

## 2024-10-23 PROCEDURE — 1159F MED LIST DOCD IN RCRD: CPT | Performed by: STUDENT IN AN ORGANIZED HEALTH CARE EDUCATION/TRAINING PROGRAM

## 2024-10-23 PROCEDURE — 99204 OFFICE O/P NEW MOD 45 MIN: CPT | Performed by: STUDENT IN AN ORGANIZED HEALTH CARE EDUCATION/TRAINING PROGRAM

## 2024-10-23 PROCEDURE — 1160F RVW MEDS BY RX/DR IN RCRD: CPT | Performed by: STUDENT IN AN ORGANIZED HEALTH CARE EDUCATION/TRAINING PROGRAM

## 2024-10-23 PROCEDURE — 1126F AMNT PAIN NOTED NONE PRSNT: CPT | Performed by: STUDENT IN AN ORGANIZED HEALTH CARE EDUCATION/TRAINING PROGRAM

## 2024-10-23 RX ORDER — KETOCONAZOLE 20 MG/G
CREAM TOPICAL
Qty: 60 G | Refills: 5 | Status: SHIPPED | OUTPATIENT
Start: 2024-10-23

## 2024-10-23 RX ORDER — TRIAMCINOLONE ACETONIDE 1 MG/G
CREAM TOPICAL
Qty: 454 G | Refills: 1 | Status: SHIPPED | OUTPATIENT
Start: 2024-10-23

## 2024-10-23 RX ORDER — AZITHROMYCIN 250 MG/1
TABLET, FILM COATED ORAL
Qty: 45 TABLET | Refills: 1 | Status: SHIPPED | OUTPATIENT
Start: 2024-10-23

## 2024-10-23 NOTE — PROGRESS NOTES
October 23, 2024    New patient     Referred by: Rita Padilla MD    CHIEF COMPLAINT: ***    HISTORY OF PRESENT ILLNESS: .    1. ***  Location: ***   Duration: ***  Signs and symptoms: ***  Current treatment: ***  Past treatments: ***    2. ***  Location: ***   Duration: ***  Signs and symptoms: ***  Current treatment: ***  Past treatments: ***    3. ***  Location: ***   Duration: ***  Signs and symptoms: ***  Current treatment: ***  Past treatments: ***    DERM HISTORY:  History of skin cancer: {Yes/No:829::\"Yes\"}  History of chronic skin disease/condition: {Yes/No:829::\"Yes\"}    FAMILY HISTORY:  History of melanoma: {Yes/No:829::\"Yes\"}  History of chronic skin disease/condition: {Yes/No:829::\"Yes\"}    History/Other:    REVIEW OF SYSTEMS:  Constitutional: Denies fever, chills, unintentional weight loss.   Skin as per HPI    PAST MEDICAL HISTORY:  Past Medical History:    Breast cancer (HCC)    core bx right breast     CVA (cerebral infarction)    aphasia    Depression    Fx humerus shaft-closed    surgical repair    Hypercholesterolemia       Medications  Current Outpatient Medications   Medication Sig Dispense Refill    triamcinolone 0.1 % External Cream Apply to affected area of back BID for 7 days 30 g 0    atorvastatin 40 MG Oral Tab Take 1 tablet (40 mg total) by mouth nightly. 90 tablet 3    losartan 100 MG Oral Tab Take 1 tablet (100 mg total) by mouth daily. 90 tablet 3       Objective:    PHYSICAL EXAM:  General: awake, alert, no acute distress  Skin: Skin exam was performed today including the following: ***. Pertinent findings include:   - ***    ASSESSMENT & PLAN:  Pathophysiology of diagnoses discussed with patient.  Therapeutic options reviewed. Risks, benefits, and alternatives discussed with patient. Instructions reviewed at length.      Return to clinic: *** or sooner if something concerning arises     Benny Miller MD

## 2024-10-23 NOTE — PROGRESS NOTES
October 23, 2024    New patient     Referred by:   No referring provider defined for this encounter.      CHIEF COMPLAINT: Rash     HISTORY OF PRESENT ILLNESS: Ines Quan is a 74 year old female here for evaluation of rash.    Location: Wrist, Chest, Back, Feet, Buttocks   Duration: 2 months ago  Signs and symptoms: Itchy, bothersome discomfort, get worse when in contact with water   Current treatment: Topical medication  Past treatments: Topical medication    Personal Dermatologic History  History of chronic skin disease: No- unknown    Family History  History of chronic skin disease: Yes- rosacea; father skin tags  History autoimmune diseases:  No    Past Medical History  Past Medical History:    Breast cancer (HCC)    core bx right breast     CVA (cerebral infarction)    aphasia    Depression    Fx humerus shaft-closed    surgical repair    Hypercholesterolemia       REVIEW OF SYSTEMS:  Constitutional: Denies fever, chills, unintentional weight loss.   Skin as per HPI    Medications  Current Outpatient Medications   Medication Sig Dispense Refill    triamcinolone 0.1 % External Cream Apply to affected area of back BID for 7 days 30 g 0    atorvastatin 40 MG Oral Tab Take 1 tablet (40 mg total) by mouth nightly. 90 tablet 3    losartan 100 MG Oral Tab Take 1 tablet (100 mg total) by mouth daily. 90 tablet 3       PHYSICAL EXAM:  General: awake, alert, no acute distress  Skin: Skin exam was performed today including the following: back, arms, feet. Pertinent findings include:   - feet with pink scaly plaques  - arms, back and chest with violaceous papules    ASSESSMENT & PLAN:  Pathophysiology of diagnoses discussed with patient.  Therapeutic options reviewed. Risks, benefits, and alternatives discussed with patient. Instructions reviewed at length.    #Rash and nonspecific skin eruption  - Differential diagnosis includes PLC although cannot exclude lichen planis  - Triamcinolone 0.1% twice daily to affected  areas Monday-Friday. Take weekends off. Avoid use on face, breasts, groin, or axiillae.   - Start azithromycin 500mg three times weekly for 3 months    #Tinea pedis   - Apply ketoconazole 2% twice daily  for 4 weeks    Return to clinic: 3 months or sooner if something concerning arises    Benny Miller MD

## 2025-03-06 ENCOUNTER — OFFICE VISIT (OUTPATIENT)
Age: 75
End: 2025-03-06
Attending: INTERNAL MEDICINE
Payer: MEDICARE

## 2025-03-06 VITALS
HEIGHT: 59 IN | OXYGEN SATURATION: 97 % | HEART RATE: 87 BPM | RESPIRATION RATE: 202 BRPM | SYSTOLIC BLOOD PRESSURE: 149 MMHG | WEIGHT: 142.38 LBS | BODY MASS INDEX: 28.7 KG/M2 | DIASTOLIC BLOOD PRESSURE: 90 MMHG | TEMPERATURE: 97 F

## 2025-03-06 DIAGNOSIS — Z85.3 HISTORY OF RIGHT BREAST CANCER: Primary | ICD-10-CM

## 2025-03-06 DIAGNOSIS — Z85.3 ENCOUNTER FOR FOLLOW-UP SURVEILLANCE OF BREAST CANCER: ICD-10-CM

## 2025-03-06 DIAGNOSIS — Z12.31 SCREENING MAMMOGRAM, ENCOUNTER FOR: ICD-10-CM

## 2025-03-06 DIAGNOSIS — Z08 ENCOUNTER FOR FOLLOW-UP SURVEILLANCE OF BREAST CANCER: ICD-10-CM

## 2025-03-06 DIAGNOSIS — Z90.11 S/P RIGHT MASTECTOMY: ICD-10-CM

## 2025-03-06 NOTE — PROGRESS NOTES
HPI   Ines Quan is a 74 year old female here for f/u of   Encounter Diagnoses   Name Primary?    History of right breast cancer Yes    Encounter for follow-up surveillance of breast cancer     Screening mammogram, encounter for     S/P right mastectomy      States feeling well.     Had a rash in her body in the fall and saw dermatology, told was a virus. Resolved.     R chest wall w/o changes.  L breast is w/o changes.  No swelling on the R arm.  Some pulling in the chest with reaching.      Weight is up.     ECOG PS: 1    Review of Systems:     Review of Systems   Constitutional:  Negative for appetite change, fatigue and unexpected weight change.   HENT:   Positive for hearing loss.    Eyes:  Positive for eye problems (wears glasses.).   Respiratory:  Negative for cough and shortness of breath.    Cardiovascular:  Negative for chest pain.   Gastrointestinal:  Negative for abdominal pain.   Musculoskeletal:  Positive for arthralgias (knees and shoulder from prior surgery.) and back pain (sciatica every so often, L>R.).   Neurological:  Positive for dizziness (with cleaning the cealing.), extremity weakness (L side.) and headaches.   Hematological:  Negative for adenopathy.   Psychiatric/Behavioral:  Positive for depression (had to put her cat down and family issues) and sleep disturbance (naps after eating at times.). The patient is nervous/anxious.            Current Outpatient Medications   Medication Sig Dispense Refill    atorvastatin 40 MG Oral Tab Take 1 tablet (40 mg total) by mouth nightly. 90 tablet 3    losartan 100 MG Oral Tab Take 1 tablet (100 mg total) by mouth daily. 90 tablet 3    triamcinolone 0.1 % External Cream Apply to the affected areas twice daily Monday-Friday. Take weekends off. Advised to AVOID on face, under breasts, underarms and groin. DO not use on foot and lower leg for time being. (Patient not taking: Reported on 3/6/2025) 454 g 1    ketoconazole 2 % External Cream Apply to  affected area 2 times daily to foot anf lower leg for 4 weeks (Patient not taking: Reported on 3/6/2025) 60 g 5    triamcinolone 0.1 % External Cream Apply to affected area of back BID for 7 days (Patient not taking: Reported on 3/6/2025) 30 g 0     Allergies:   No Known Allergies    Past Medical History:    Breast cancer (HCC)    core bx right breast     CVA (cerebral infarction)    aphasia    Depression    Fx humerus shaft-closed    surgical repair    Hypercholesterolemia     Past Surgical History:   Procedure Laterality Date    Humerus fracture surgery Right 12/2015    ORIF    Mastectomy right  2015    Radiation right  2015     Social History     Socioeconomic History    Marital status:     Number of children: 2   Occupational History    Occupation: disabled      Comment: exposure to chemical fumes    Tobacco Use    Smoking status: Some Days     Current packs/day: 1.00     Average packs/day: 1 pack/day for 20.0 years (20.0 ttl pk-yrs)     Types: Cigarettes    Smokeless tobacco: Never   Substance and Sexual Activity    Alcohol use: Yes     Alcohol/week: 1.0 standard drink of alcohol     Types: 1 Glasses of wine per week     Comment: rarely    Drug use: No   Other Topics Concern    Caffeine Concern Yes     Comment: About 8 cups of coffee per day...2 cans of pop per day.    Occupational Exposure Yes     Comment: toxic fumes     Breast feeding No    Reaction to local anesthetic No    Pt has a pacemaker No    Pt has a defibrillator No       Family History   Problem Relation Age of Onset    Prostate Cancer Father 60 - 70    Hypertension Father     Other (skin tags) Father     Arthritis Mother     Other (rosacea) Mother     Breast Cancer Self 64    Breast Cancer Paternal Cousin Female         age 40s         PHYSICAL EXAM:    /90 (BP Location: Left arm, Patient Position: Sitting, Cuff Size: adult)   Pulse 87   Temp 97 °F (36.1 °C) (Other)   Resp (!) 202   Ht 1.499 m (4' 11\")   Wt 64.6 kg (142 lb 6.4  oz)   SpO2 97%   BMI 28.76 kg/m²   Wt Readings from Last 6 Encounters:   03/06/25 64.6 kg (142 lb 6.4 oz)   10/03/24 63 kg (139 lb)   09/09/24 62.1 kg (137 lb)   04/11/24 58.3 kg (128 lb 9.6 oz)   03/05/24 54.9 kg (121 lb)   10/03/23 52.3 kg (115 lb 3.2 oz)     General: Patient is alert, not in acute distress.  HEENT: EOMs intact. PERRL.   Neck: No JVD. No palpable lymphadenopathy. Neck is supple.  Chest: Clear to auscultation.  R breast mastectomy, L breast no changes.  Chest with skin with macules that are hyperpigmented.   Heart: Regular rate and rhythm.   Abdomen: Soft, non tender with good bowel sounds.  Extremities: No edema.  Neurological: Grossly intact.   Lymphatics: There is no palpable lymphadenopathy throughout in the cervical, supraclavicular, axillary, or inguinal regions.  Psych/Depression: nl        ASSESSMENT/PLAN:     Encounter Diagnoses   Name Primary?    History of right breast cancer Yes    Encounter for follow-up surveillance of breast cancer     Screening mammogram, encounter for     S/P right mastectomy         Breast cancer, right breast (HCC)    Primary site: Breast (Right)    Staging method: AJCC 7th Edition    Clinical: Stage IIB (T3, N0, cM0) - Signed by Ulysses Lucero MD on 7/4/2014    Pathologic: Stage IIA (T1c, N1a, cM0) - Signed by Homer Lucero MD on 8/25/2015    Summary: Stage IIA (T1c, N1a, cM0)    Pathologic comments:      %      AZ 54%      UTE6Iel 1+      Ki 67 13% borderline            Oncotype Dx RS for node positive 1-3  RS 19, no benefit from addition of       chemotherapy.      S/p 1 yr of neoadjuvant femara with some down staging from stage IIB to stage IIA.  She did have positive LNs with extracapsular extension.  2/3 LN.    RT to chest wall and axilla completed.        Oncotype Dx RS of 19.  No additional benefit from chemo.    Completed 5 yrs total of adjuvant letrozole in August of 2019.      Osteoporosis:  DEXA scan May 2019 was worse.  Patient was  evaluated by Dr. Manning on 09/20. She was scared about biphosphonates and declined.      Patient due for mammogram in April 2025.  Order given.  Last mammogram in April 2024 was benign.    Follow up in 1 year    Low risk.    No orders of the defined types were placed in this encounter.      Results From Past 48 Hours:  No results found for this or any previous visit (from the past 48 hours).    None   No orders of the defined types were placed in this encounter.    PROCEDURE: LEÓN JAJA 2D+3D SCREENING LEFT ([id number])     COMPARISON: [facility], LEÓN JAJA 2D+3D SCREENING LEFT ([id number]), 8/13/2020, 3:27 PM.  [facility], LEÓN SCREENING LEFT (CPT=77067-52), 5/05/2022, 12:41 PM.  [facility], [facility]  JAJA 2D+3D DIAGNOSTIC ADDL VWS  LEFT (XXE=[id number]), 5/24/2022, 1:40 PM.  [facility], LEÓN JAJA 2D+3D DIAGNOSTIC LEÓN LEFT (CPT=77065/08721), 12/14/2022, 12:30 PM.     INDICATIONS: Z90.11 S/P right mastectomy Z12.31 Screening mammogram, encounter for     TECHNIQUE: Full field direct screening mammography was performed and images were reviewed with the [employer] ZAINAB 1.5.1.5 CAD device.  3D tomosynthesis was performed and reviewed        BREAST COMPOSITION:   Category b-Scattered areas fibroglandular density.        FINDINGS: There is no suspicious asymmetry, mass, architectural distortion, or microcalcifications identified in the left breast.                    Impression   CONCLUSION:   There is no mammographic evidence of malignancy in the left breast. As long as patient's clinical breast exam remains unchanged, annual screening mammogram is recommended.     BI-RADS CATEGORY:    DIAGNOSTIC CATEGORY 2--BENIGN FINDING:       RECOMMENDATIONS:  ROUTINE MAMMOGRAM AND CLINICAL EVALUATION IN 12 MONTHS.                   PLEASE NOTE: NORMAL MAMMOGRAM DOES NOT EXCLUDE THE POSSIBILITY OF BREAST CANCER.  A CLINICALLY SUSPICIOUS  PALPABLE LUMP SHOULD BE BIOPSIED.       For patients over the age of 40, the target due date for the patient's next mammogram has been entered into a reminder system.       Patient received a discharge summary from the technologist after completion of exam.     Breast marker legend used on images    Triangle = Palpable lump  [place] = Skin tag or mole  BB = Nipple  Linear feng = Scar  Square = Pain           Dictated by (CST): Elvia Darling DO on 4/26/2024 at 4:32 PM      Finalized by (CST): Elvia Darling DO on 4/26/2024 at 4:33 PM

## 2025-04-23 PROBLEM — Z85.3 ENCOUNTER FOR FOLLOW-UP SURVEILLANCE OF BREAST CANCER: Status: RESOLVED | Noted: 2020-08-26 | Resolved: 2025-04-23

## 2025-04-23 PROBLEM — Z08 ENCOUNTER FOR FOLLOW-UP SURVEILLANCE OF BREAST CANCER: Status: RESOLVED | Noted: 2020-08-26 | Resolved: 2025-04-23

## 2025-04-28 ENCOUNTER — OFFICE VISIT (OUTPATIENT)
Dept: INTERNAL MEDICINE CLINIC | Facility: CLINIC | Age: 75
End: 2025-04-28
Payer: COMMERCIAL

## 2025-04-28 VITALS
HEART RATE: 103 BPM | OXYGEN SATURATION: 98 % | DIASTOLIC BLOOD PRESSURE: 80 MMHG | BODY MASS INDEX: 28.22 KG/M2 | WEIGHT: 140 LBS | SYSTOLIC BLOOD PRESSURE: 140 MMHG | HEIGHT: 59 IN

## 2025-04-28 DIAGNOSIS — E78.2 MIXED HYPERLIPIDEMIA: ICD-10-CM

## 2025-04-28 DIAGNOSIS — Z85.3 HX OF BREAST CANCER: ICD-10-CM

## 2025-04-28 DIAGNOSIS — Z00.00 MEDICARE ANNUAL WELLNESS VISIT, SUBSEQUENT: Primary | ICD-10-CM

## 2025-04-28 DIAGNOSIS — I10 PRIMARY HYPERTENSION: ICD-10-CM

## 2025-04-28 DIAGNOSIS — H53.8 BLURRED VISION, BILATERAL: ICD-10-CM

## 2025-04-28 NOTE — PROGRESS NOTES
Subjective:   Ines Quan is a 74 year old female who presents for No chief complaint on file.     Patient here for a Ma supervisit and fu on hx of breast cancer, htn, hyperlipidemia.  Has gained weight.  History/Other:    No Further Nursing Notes to Review  Tobacco Reviewed  Allergies   Reviewed  Medications Reviewed  Problem List Reviewed  Medical History   Reviewed  Surgical History Reviewed  Family History Reviewed         Tobacco: quit smoking  aPlease update the information in the Tobacco history section to reflect the true status, and refresh this smartlink.Tobacco Use[1]     Tobacco cessation counseling for <3 minutes.      Current Medications[2]      Review of Systems:  Review of Systems    HPI:   Ines Quan is a 74 year old female who presents for a MA Supervisit.    Problem List[3]    General Health                                                   Functional Ability                                                        Functional Status                                     Fall/Risk Assessment                                                              Depression Screening (PHQ-2/PHQ-9): Over the LAST 2 WEEKS                      Advance Directives              Was Medicare Assessment Questionnaire completed by patient and sent to HIM for scanning?Yes    Please go to \"Cognitive Assessment\" under Medicare Assessment section in Charting, test patient and document.    Then, refresh your progress note to see your input here.  Cognitive Assessment     What day of the week is this?: Correct    What month is it?: Correct    What year is it?: Correct    Recall \"Ball\": Correct    Recall \"Flag\": Correct    Recall \"Tree\": Correct      ALLERGIES:   Allergies[4]    CURRENT MEDICATIONS:   Current Medications[5]   MEDICAL INFORMATION:   Past Medical History[6]   Past Surgical History[7]   Family History[8]   SOCIAL HISTORY:   Short Social Hx on File[9]  Occ: retired : no     REVIEW OF  SYSTEMS:   GENERAL: feels well otherwise  SKIN: denies any unusual skin lesions  EYES: denies blurred vision or double vision bifocal lenses  HEENT: denies nasal congestion, sinus pain or ST bilateral hearing aids  LUNGS: denies shortness of breath with exertion  CARDIOVASCULAR: denies chest pain on exertion  GI: denies abdominal pain, denies heartburn looses bowel movements  : denies dysuria, vaginal discharge or itching, no complaint of urinary incontinence   MUSCULOSKELETAL: denies back pain ankle pain  NEURO: denies headaches  PSYCHE: denies depression or anxiety  HEMATOLOGIC: denies hx of anemia  ENDOCRINE: denies thyroid history  ALL/ASTHMA: denies hx of allergy or asthma    EXAM:   /80   Pulse 103   Ht 4' 11\" (1.499 m)   Wt 140 lb (63.5 kg)   SpO2 98%   BMI 28.28 kg/m²      >   Wt Readings from Last 6 Encounters:   04/28/25 140 lb (63.5 kg)   03/06/25 142 lb 6.4 oz (64.6 kg)   10/03/24 139 lb (63 kg)   09/09/24 137 lb (62.1 kg)   04/11/24 128 lb 9.6 oz (58.3 kg)   03/05/24 121 lb (54.9 kg)       >   BP Readings from Last 3 Encounters:   04/28/25 140/80   03/06/25 149/90   10/03/24 110/60       GENERAL: well developed, well nourished, in no apparent distress  SKIN: no rashes, no suspicious lesions  HEENT: atraumatic, normocephalic, ears and throat are clear    Hearing intact with hearing aids  EYES: PERRLA, EOMI, conjunctiva are clear  Right Eye Visual Acuity: Corrected Left Eye Visual Acuity: Corrected   Right Eye Chart Acuity: 20/20 Left Eye Chart Acuity: 20/20   NECK: supple, no adenopathy, no bruits  CHEST: no chest tenderness  BREAST: no masses, no discharge or no axillary lymphadenopathy right mastectomy left breasts no masses  LUNGS: clear to auscultation  CARDIO: RRR without murmur  GI: good BS's, no masses, HSM or tenderness  : deferred  RECTAL: deferred  MUSCULOSKELETAL: back is not tender, FROM of the back  EXTREMITIES: no cyanosis, clubbing or edema.  NEURO: Oriented times three,  cranial nerves are intact, motor and sensory are grossly intact    ASSESSMENT AND OTHER RELEVANT CHRONIC CONDITIONS:   Ines Quan is a 74 year old female who presents for a Medicare Assessment.     PLAN SUMMARY:   Diagnoses and all orders for this visit:    Medicare annual wellness visit, subsequent    Hx of breast cancer    Primary hypertension    Mixed hyperlipidemia         The patient indicates understanding of these issues and agrees to the plan.  The patient is asked to return in 12m for fu.       PREVENTATIVE SERVICES  INDICATIONS AND SCHEDULE Internal Lab or Procedure External Lab or Procedure   Diabetes Screening      HbgA1C   Annually HEMOGLOBIN A1c (% of total Hgb)   Date Value   02/23/2023 5.1         No data to display                Fasting Blood Sugar (FSB)Annually No results found for: \"GLUCOSE\"    Cardiovascular Disease Screening     LDL Annually LDL-CHOLESTEROL (mg/dL (calc))   Date Value   04/11/2024 120 (H)        EKG One Time     Colorectal Cancer Screening      Colonoscopy Screen every 10 years Health Maintenance   Topic Date Due    Colorectal Cancer Screening  Never done    Update Health Maintenance if applicable    Flex Sigmoidoscopy Screen every 10 years No results found for this or any previous visit.      No data to display                 Fecal Occult Blood Annually No results found for: \"FOB\"      No data to display                Glaucoma Screening      Ophthalmology Visit Annually     Bone Density Screening      Dexascan Every two years Last Dexa Scan:    XR DEXA BONE DENSITOMETRY (CPT=77080) 05/08/2019        No data to display                Pap and Pelvic      Pap  Annually if high risk No recommendations at this time Update Health Maintenance if applicable    Pap  Every two years No recommendations at this time Update Health Maintenance if applicable    Chlamydia  Annually if high risk No results found for: \"CHLAMYDIA\"      No data to display                Screening  Mammogram      Mammogram  Annually Health Maintenance   Topic Date Due    Mammogram  04/25/2025    Update Health Maintenance if applicable   Immunizations      Influenza No orders found for this or any previous visit. Update Immunization Activity if applicable    Pneumococcal Orders placed or performed in visit on 04/02/19    Pneumococcal (Prevnar 13) (55587) (DX V03.82/Z23)    Update Immunization Activity if applicable    Hepatitis B No orders found for this or any previous visit. Update Immunization Activity if applicable    Tetanus No orders found for this or any previous visit. Update Immunization Activity if applicable        SPECIFIC DISEASE MONITORING Internal Lab or Procedure External Lab or Procedure   Annual Monitoring of Persistent     Medications (ACE/ARB, digoxin diuretics, anticonvulsants.)    Potassium  Annually POTASSIUM (mmol/L)   Date Value   04/11/2024 4.6         No data to display                Creatinine  Annually CREATININE (mg/dL)   Date Value   04/11/2024 0.72         No data to display                BUN  Annually UREA NITROGEN (BUN) (mg/dL)   Date Value   04/11/2024 13         No data to display                 Drug Serum Conc  Annually No results found for: \"DIGOXIN\", \"DIG\", \"VALP\"      No data to display                Diabetes      HgbA1C  Annually HEMOGLOBIN A1c (% of total Hgb)   Date Value   02/23/2023 5.1         No data to display                Creat/alb ratio  Annually      LDL  Annually LDL-CHOLESTEROL (mg/dL (calc))   Date Value   04/11/2024 120 (H)         No data to display                 Dilated Eye exam  Annually      No data to display                   No data to display                COPD      Spirometry Testing Annually No results found for this or any previous visit.      No data to display                  SCREENING SCHEDULE - FEMALE      SCREEN COVERAGE SCHEDULE  (If Indicated) LAST DONE   Dexa If at Risk q2 years    Lipids All Patients q5 years LDL-CHOLESTEROL  (mg/dL (calc))   Date Value   04/11/2024 120 (H)      Colonoscopy All Patients q10 years Health Maintenance   Topic Date Due    Colorectal Cancer Screening  Never done      FBS All Patients q1 year No results found for: \"GLUCOSE\"   Glaucoma If at high risk q1 year    Pap If at high risk q1 year No recommendations at this time   Pap All Patients q2 year if indicated No recommendations at this time   Mammogram Age > 39 q1 year Health Maintenance   Topic Date Due    Mammogram  04/25/2025      EKG All Patients One at initial exam    Vaccines:      Pneumococcal All Patients Once per lifetime Orders placed or performed in visit on 04/02/19    Pneumococcal (Prevnar 13) (22910) (DX V03.82/Z23)      Influenza All Patients Annually No orders found for this or any previous visit.   Hepatitis B If at Risk 3 scheduled doses No orders found for this or any previous visit.         SUGGESTED VACCINATIONS - Influenza, Pneumococcal, Zoster, Tetanus     Immunization History   Administered Date(s) Administered    High Dose Fluzone Influenza Vaccine, 65yr+ PF 0.5mL (51983) 11/02/2016   Deferred Date(s) Deferred    Pneumococcal (Prevnar 13) 04/02/2019           Objective:   /80   Pulse 103   Ht 4' 11\" (1.499 m)   Wt 140 lb (63.5 kg)   SpO2 98%   BMI 28.28 kg/m²  Estimated body mass index is 28.28 kg/m² as calculated from the following:    Height as of this encounter: 4' 11\" (1.499 m).    Weight as of this encounter: 140 lb (63.5 kg).  Physical Exam      Assessment & Plan:   1. Medicare annual wellness visit, subsequent (Primary) check fasting labs  2. Hx of breast cancer in clinical remmision  3. Primary hypertension controlled to goal controlled withstatin  4. Mixed hyperlipidemia        No follow-ups on file.    Rita Padilla MD, 4/28/2025, 1:06 PM        [1]   Social History  Tobacco Use   Smoking Status Some Days    Current packs/day: 1.00    Average packs/day: 1 pack/day for 20.0 years (20.0 ttl pk-yrs)    Types:  Cigarettes   Smokeless Tobacco Never   [2]   Current Outpatient Medications   Medication Sig Dispense Refill    triamcinolone 0.1 % External Cream Apply to the affected areas twice daily Monday-Friday. Take weekends off. Advised to AVOID on face, under breasts, underarms and groin. DO not use on foot and lower leg for time being. (Patient not taking: Reported on 3/6/2025) 454 g 1    ketoconazole 2 % External Cream Apply to affected area 2 times daily to foot anf lower leg for 4 weeks (Patient not taking: Reported on 3/6/2025) 60 g 5    triamcinolone 0.1 % External Cream Apply to affected area of back BID for 7 days (Patient not taking: Reported on 3/6/2025) 30 g 0    atorvastatin 40 MG Oral Tab Take 1 tablet (40 mg total) by mouth nightly. 90 tablet 3    losartan 100 MG Oral Tab Take 1 tablet (100 mg total) by mouth daily. 90 tablet 3   [3]   Patient Active Problem List  Diagnosis    History of breast cancer    Encounter for monitoring aromatase inhibitor therapy    S/P right mastectomy    Osteoporosis of lumbar spine    Screening mammogram, encounter for    Closed displaced fracture of surgical neck of right humerus    History of right breast cancer    Osteoporosis    Smokers' cough (HCC)    Rosacea   [4] No Known Allergies  [5]   Current Outpatient Medications   Medication Sig Dispense Refill    triamcinolone 0.1 % External Cream Apply to the affected areas twice daily Monday-Friday. Take weekends off. Advised to AVOID on face, under breasts, underarms and groin. DO not use on foot and lower leg for time being. (Patient not taking: Reported on 3/6/2025) 454 g 1    ketoconazole 2 % External Cream Apply to affected area 2 times daily to foot anf lower leg for 4 weeks (Patient not taking: Reported on 3/6/2025) 60 g 5    triamcinolone 0.1 % External Cream Apply to affected area of back BID for 7 days (Patient not taking: Reported on 3/6/2025) 30 g 0    atorvastatin 40 MG Oral Tab Take 1 tablet (40 mg total) by mouth  nightly. 90 tablet 3    losartan 100 MG Oral Tab Take 1 tablet (100 mg total) by mouth daily. 90 tablet 3   [6]   Past Medical History:   Breast cancer (HCC)    core bx right breast     CVA (cerebral infarction)    aphasia    Depression    Fx humerus shaft-closed    surgical repair    Hypercholesterolemia   [7]   Past Surgical History:  Procedure Laterality Date    Humerus fracture surgery Right 12/2015    ORIF    Mastectomy right  2015    Radiation right  2015   [8]   Family History  Problem Relation Age of Onset    Prostate Cancer Father 60 - 70    Hypertension Father     Other (skin tags) Father     Arthritis Mother     Other (rosacea) Mother     Breast Cancer Self 64    Breast Cancer Paternal Cousin Female         age 40s   [9]   Social History  Socioeconomic History    Marital status:     Number of children: 2   Occupational History    Occupation: disabled      Comment: exposure to chemical fumes    Tobacco Use    Smoking status: Some Days     Current packs/day: 1.00     Average packs/day: 1 pack/day for 20.0 years (20.0 ttl pk-yrs)     Types: Cigarettes    Smokeless tobacco: Never   Substance and Sexual Activity    Alcohol use: Yes     Alcohol/week: 1.0 standard drink of alcohol     Types: 1 Glasses of wine per week     Comment: rarely    Drug use: No   Other Topics Concern    Caffeine Concern Yes     Comment: About 8 cups of coffee per day...2 cans of pop per day.    Occupational Exposure Yes     Comment: toxic fumes     Breast feeding No    Reaction to local anesthetic No    Pt has a pacemaker No    Pt has a defibrillator No

## 2025-05-07 ENCOUNTER — HOSPITAL ENCOUNTER (OUTPATIENT)
Dept: MAMMOGRAPHY | Facility: HOSPITAL | Age: 75
Discharge: HOME | End: 2025-05-07
Attending: INTERNAL MEDICINE
Payer: MEDICARE

## 2025-05-07 DIAGNOSIS — Z90.11 S/P RIGHT MASTECTOMY: ICD-10-CM

## 2025-05-07 DIAGNOSIS — Z12.31 SCREENING MAMMOGRAM, ENCOUNTER FOR: ICD-10-CM

## 2025-05-07 PROCEDURE — 77063 BREAST TOMOSYNTHESIS BI: CPT | Performed by: INTERNAL MEDICINE

## 2025-05-07 PROCEDURE — 77067 SCR MAMMO BI INCL CAD: CPT | Performed by: INTERNAL MEDICINE

## 2025-05-08 ENCOUNTER — LAB ENCOUNTER (OUTPATIENT)
Dept: LAB | Age: 75
End: 2025-05-08
Attending: INTERNAL MEDICINE
Payer: MEDICARE

## 2025-05-08 LAB
ALBUMIN SERPL-MCNC: 5 G/DL (ref 3.2–4.8)
ALBUMIN/GLOB SERPL: 2.8 {RATIO} (ref 1–2)
ALP LIVER SERPL-CCNC: 81 U/L (ref 55–142)
ALT SERPL-CCNC: 37 U/L (ref 10–49)
ANION GAP SERPL CALC-SCNC: 7 MMOL/L (ref 0–18)
AST SERPL-CCNC: 29 U/L (ref ?–34)
BASOPHILS # BLD AUTO: 0.07 X10(3) UL (ref 0–0.2)
BASOPHILS NFR BLD AUTO: 0.9 %
BILIRUB SERPL-MCNC: 0.8 MG/DL (ref 0.2–1.1)
BUN BLD-MCNC: 12 MG/DL (ref 9–23)
BUN/CREAT SERPL: 13.3 (ref 10–20)
CALCIUM BLD-MCNC: 9.4 MG/DL (ref 8.7–10.4)
CHLORIDE SERPL-SCNC: 108 MMOL/L (ref 98–112)
CHOLEST SERPL-MCNC: 182 MG/DL (ref ?–200)
CO2 SERPL-SCNC: 27 MMOL/L (ref 21–32)
CREAT BLD-MCNC: 0.9 MG/DL (ref 0.55–1.02)
DEPRECATED RDW RBC AUTO: 43.6 FL (ref 35.1–46.3)
EGFRCR SERPLBLD CKD-EPI 2021: 67 ML/MIN/1.73M2 (ref 60–?)
EOSINOPHIL # BLD AUTO: 0.1 X10(3) UL (ref 0–0.7)
EOSINOPHIL NFR BLD AUTO: 1.3 %
ERYTHROCYTE [DISTWIDTH] IN BLOOD BY AUTOMATED COUNT: 13.2 % (ref 11–15)
FASTING PATIENT LIPID ANSWER: YES
FASTING STATUS PATIENT QL REPORTED: YES
GLOBULIN PLAS-MCNC: 1.8 G/DL (ref 2–3.5)
GLUCOSE BLD-MCNC: 98 MG/DL (ref 70–99)
HCT VFR BLD AUTO: 44.3 % (ref 35–48)
HDLC SERPL-MCNC: 61 MG/DL (ref 40–59)
HGB BLD-MCNC: 14.6 G/DL (ref 12–16)
IMM GRANULOCYTES # BLD AUTO: 0.02 X10(3) UL (ref 0–1)
IMM GRANULOCYTES NFR BLD: 0.3 %
LDLC SERPL CALC-MCNC: 95 MG/DL (ref ?–100)
LYMPHOCYTES # BLD AUTO: 2.51 X10(3) UL (ref 1–4)
LYMPHOCYTES NFR BLD AUTO: 32.1 %
MCH RBC QN AUTO: 29.9 PG (ref 26–34)
MCHC RBC AUTO-ENTMCNC: 33 G/DL (ref 31–37)
MCV RBC AUTO: 90.6 FL (ref 80–100)
MONOCYTES # BLD AUTO: 0.35 X10(3) UL (ref 0.1–1)
MONOCYTES NFR BLD AUTO: 4.5 %
NEUTROPHILS # BLD AUTO: 4.77 X10 (3) UL (ref 1.5–7.7)
NEUTROPHILS # BLD AUTO: 4.77 X10(3) UL (ref 1.5–7.7)
NEUTROPHILS NFR BLD AUTO: 60.9 %
NONHDLC SERPL-MCNC: 121 MG/DL (ref ?–130)
OSMOLALITY SERPL CALC.SUM OF ELEC: 294 MOSM/KG (ref 275–295)
PLATELET # BLD AUTO: 227 10(3)UL (ref 150–450)
POTASSIUM SERPL-SCNC: 4.2 MMOL/L (ref 3.5–5.1)
PROT SERPL-MCNC: 6.8 G/DL (ref 5.7–8.2)
RBC # BLD AUTO: 4.89 X10(6)UL (ref 3.8–5.3)
SODIUM SERPL-SCNC: 142 MMOL/L (ref 136–145)
T4 FREE SERPL-MCNC: 1.2 NG/DL (ref 0.8–1.7)
TRIGL SERPL-MCNC: 151 MG/DL (ref 30–149)
TSI SER-ACNC: 2.04 UIU/ML (ref 0.55–4.78)
VLDLC SERPL CALC-MCNC: 25 MG/DL (ref 0–30)
WBC # BLD AUTO: 7.8 X10(3) UL (ref 4–11)

## 2025-05-08 PROCEDURE — 85025 COMPLETE CBC W/AUTO DIFF WBC: CPT | Performed by: INTERNAL MEDICINE

## 2025-05-08 PROCEDURE — 80061 LIPID PANEL: CPT | Performed by: INTERNAL MEDICINE

## 2025-05-08 PROCEDURE — 84443 ASSAY THYROID STIM HORMONE: CPT | Performed by: INTERNAL MEDICINE

## 2025-05-08 PROCEDURE — 84439 ASSAY OF FREE THYROXINE: CPT | Performed by: INTERNAL MEDICINE

## 2025-05-08 PROCEDURE — 36415 COLL VENOUS BLD VENIPUNCTURE: CPT | Performed by: INTERNAL MEDICINE

## 2025-05-08 PROCEDURE — 80053 COMPREHEN METABOLIC PANEL: CPT | Performed by: INTERNAL MEDICINE

## 2025-08-15 RX ORDER — ATORVASTATIN CALCIUM 40 MG/1
40 TABLET, FILM COATED ORAL NIGHTLY
Qty: 90 TABLET | Refills: 3 | Status: SHIPPED | OUTPATIENT
Start: 2025-08-15

## 2025-08-18 RX ORDER — LOSARTAN POTASSIUM 100 MG/1
100 TABLET ORAL DAILY
Qty: 90 TABLET | Refills: 0 | Status: SHIPPED | OUTPATIENT
Start: 2025-08-18

## (undated) NOTE — MR AVS SNAPSHOT
[address] [address]  [phone]               Thank you for choosing us for your health care visit with Emelia Bragg DO.   We are glad to serve you and happy to provide you with this cardona Take 1 tablet (80 mg total) by mouth daily. Commonly known as:  DIOVAN           Venlafaxine HCl 37.5 MG Tabs   Take 1 tablet (37.5 mg total) by mouth daily.    Commonly known as:  EFFEXOR           Vitamin D3 5000 units Tabs   Take 5,000 Units by mouth d

## (undated) NOTE — LETTER
[facility]      [address]          Ines Quan  :  1950  [address] [phone] (H)    To Whom It May Concern:    Please accept this letter as medical excuse reason to released fro

## (undated) NOTE — MR AVS SNAPSHOT
Ines Kadeem   5/15/2017 1:00 PM   Office Visit   MRN:  [id number]    Description:  Female : 1950   Provider:  Clare Middleton   Department:  [facility]              Visit Summary      Primary Visit Diagnosis Return in about 6 months (around 11/15/2017) for monitoring medication.             [facility]

## (undated) NOTE — MR AVS SNAPSHOT
[address]  [phone]               Thank you for choosing us for your health care visit with ABDIEL Jasmine Cera.   We are glad to serve you and happy to provide you with this summary of resume activity, don't let yourself get too tired. · Avoid being exposed to cigarette smoke (yours or others’). · You may use acetaminophen or ibuprofen to control pain and fever, unless another medicine was prescribed.  (Note: If you have chronic liver o No Known Allergies                Today's Vital Signs     BP Pulse Temp             124/80 mmHg 90 98.2 °F (36.8 °C) (Oral)            Current Medications          This list is accurate as of: 4/11/17  3:15 PM.  Always use your most recent med list. Don’t eat while distracted and slow down. Avoid over sized portions. Don’t eat while when you’re bored.      EAT THESE FOODS MORE OFTEN: EAT THESE FOODS LESS OFTEN:   Make half your plate fruits and vegetables Highly refined, white starches including wh

## (undated) NOTE — MR AVS SNAPSHOT
[address] [facility] [address] [phone]               Thank you for choosing us for your health care visit with Manuela Sanabria MD.  We are glad to serve you and happy to provide you with [facility] Take 1 tablet (37.5 mg total) by mouth daily. Commonly known as:  EFFEXOR           Vitamin D3 5000 units Tabs   Take 5,000 Units by mouth daily.                    Today's Orders     Comp Metabolic Panel (14) [E]    Complete by:  Feb 17, 2017 (Approximat

## (undated) NOTE — MR AVS SNAPSHOT
[address] [address]  [phone]               Thank you for choosing us for your health care visit with Isaac Palafox MD.  We are glad to serve you and happy to provide you with [facility] Normal Orders This Visit    Madelin Race - INTERNAL [[id number] CUSTOM]  Order #:  [id number]         **REFERRAL REQUEST**    Your physician has referred you to a specialist.  Your physician or the clinic staff will provide you with the phone number you should call Take 1 tablet (2.5 mg total) by mouth once daily. Commonly known as:  FEMARA           Risedronate Sodium 150 MG Tabs   Take 1 tablet (150 mg total) by mouth every 30 (thirty) days.    Commonly known as:  ACTONEL           valsartan 80 MG Tabs   Take 1 ta ? Do not use slippery wax on bare floors. ? Keep furniture in its accustomed place. ? If you have pets, be careful that you don’t trip over them. OUTSIDE SAFETY TIPS  ? Always wear good shoes with proper support and traction. ?  Always use hand rails

## (undated) NOTE — MR AVS SNAPSHOT
Ines Quan   2017 2:30 PM   Office Visit   MRN:  [id number]    Description:  Female : 1950   Provider:  Christopher Franklin   Department:  [address] [place] [address]              Visit Summary      [place]er